# Patient Record
Sex: MALE | Race: WHITE
[De-identification: names, ages, dates, MRNs, and addresses within clinical notes are randomized per-mention and may not be internally consistent; named-entity substitution may affect disease eponyms.]

---

## 2020-09-05 ENCOUNTER — HOSPITAL ENCOUNTER (OUTPATIENT)
Dept: HOSPITAL 88 - ER | Age: 75
Setting detail: OBSERVATION
LOS: 1 days | Discharge: HOME | End: 2020-09-06
Attending: INTERNAL MEDICINE | Admitting: INTERNAL MEDICINE
Payer: MEDICARE

## 2020-09-05 VITALS — BODY MASS INDEX: 26.12 KG/M2 | HEIGHT: 66 IN | WEIGHT: 162.5 LBS

## 2020-09-05 DIAGNOSIS — I10: ICD-10-CM

## 2020-09-05 DIAGNOSIS — C61: ICD-10-CM

## 2020-09-05 DIAGNOSIS — R11.10: ICD-10-CM

## 2020-09-05 DIAGNOSIS — I95.9: Primary | ICD-10-CM

## 2020-09-05 DIAGNOSIS — Z11.59: ICD-10-CM

## 2020-09-05 DIAGNOSIS — J45.909: ICD-10-CM

## 2020-09-05 DIAGNOSIS — E78.00: ICD-10-CM

## 2020-09-05 LAB
ALBUMIN SERPL-MCNC: 4.1 G/DL (ref 3.5–5)
ALBUMIN/GLOB SERPL: 1.5 {RATIO} (ref 0.8–2)
ALP SERPL-CCNC: 66 IU/L (ref 40–150)
ALT SERPL-CCNC: 41 IU/L (ref 0–55)
ANION GAP SERPL CALC-SCNC: 18.2 MMOL/L (ref 8–16)
BASOPHILS # BLD AUTO: 0.1 10*3/UL (ref 0–0.1)
BASOPHILS NFR BLD AUTO: 0.8 % (ref 0–1)
BUN SERPL-MCNC: 27 MG/DL (ref 7–26)
BUN/CREAT SERPL: 25 (ref 6–25)
CALCIUM SERPL-MCNC: 9.8 MG/DL (ref 8.4–10.2)
CHLORIDE SERPL-SCNC: 102 MMOL/L (ref 98–107)
CK MB SERPL-MCNC: 3.2 NG/ML (ref 0–5)
CK SERPL-CCNC: 59 IU/L (ref 30–200)
CO2 SERPL-SCNC: 20 MMOL/L (ref 22–29)
DEPRECATED NEUTROPHILS # BLD AUTO: 5.6 10*3/UL (ref 2.1–6.9)
EGFRCR SERPLBLD CKD-EPI 2021: > 60 ML/MIN (ref 60–?)
EOSINOPHIL # BLD AUTO: 0.2 10*3/UL (ref 0–0.4)
EOSINOPHIL NFR BLD AUTO: 2.4 % (ref 0–6)
ERYTHROCYTE [DISTWIDTH] IN CORD BLOOD: 15.8 % (ref 11.7–14.4)
GLOBULIN PLAS-MCNC: 2.7 G/DL (ref 2.3–3.5)
GLUCOSE SERPLBLD-MCNC: 112 MG/DL (ref 74–118)
HCT VFR BLD AUTO: 37.6 % (ref 38.2–49.6)
HGB BLD-MCNC: 12.1 G/DL (ref 14–18)
LYMPHOCYTES # BLD: 1.3 10*3/UL (ref 1–3.2)
LYMPHOCYTES NFR BLD AUTO: 16.9 % (ref 18–39.1)
MCH RBC QN AUTO: 26.9 PG (ref 28–32)
MCHC RBC AUTO-ENTMCNC: 32.2 G/DL (ref 31–35)
MCV RBC AUTO: 83.6 FL (ref 81–99)
MONOCYTES # BLD AUTO: 0.7 10*3/UL (ref 0.2–0.8)
MONOCYTES NFR BLD AUTO: 8.5 % (ref 4.4–11.3)
NEUTS SEG NFR BLD AUTO: 71 % (ref 38.7–80)
PLATELET # BLD AUTO: 179 X10E3/UL (ref 140–360)
POTASSIUM SERPL-SCNC: 4.2 MMOL/L (ref 3.5–5.1)
RBC # BLD AUTO: 4.5 X10E6/UL (ref 4.3–5.7)
SODIUM SERPL-SCNC: 136 MMOL/L (ref 136–145)

## 2020-09-05 PROCEDURE — 70450 CT HEAD/BRAIN W/O DYE: CPT

## 2020-09-05 PROCEDURE — 85025 COMPLETE CBC W/AUTO DIFF WBC: CPT

## 2020-09-05 PROCEDURE — 93005 ELECTROCARDIOGRAM TRACING: CPT

## 2020-09-05 PROCEDURE — 80061 LIPID PANEL: CPT

## 2020-09-05 PROCEDURE — 82550 ASSAY OF CK (CPK): CPT

## 2020-09-05 PROCEDURE — 99285 EMERGENCY DEPT VISIT HI MDM: CPT

## 2020-09-05 PROCEDURE — 36415 COLL VENOUS BLD VENIPUNCTURE: CPT

## 2020-09-05 PROCEDURE — 80053 COMPREHEN METABOLIC PANEL: CPT

## 2020-09-05 PROCEDURE — 82553 CREATINE MB FRACTION: CPT

## 2020-09-05 PROCEDURE — 84484 ASSAY OF TROPONIN QUANT: CPT

## 2020-09-06 VITALS — DIASTOLIC BLOOD PRESSURE: 71 MMHG | SYSTOLIC BLOOD PRESSURE: 109 MMHG

## 2020-09-06 VITALS — SYSTOLIC BLOOD PRESSURE: 118 MMHG | DIASTOLIC BLOOD PRESSURE: 81 MMHG

## 2020-09-06 VITALS — SYSTOLIC BLOOD PRESSURE: 107 MMHG | DIASTOLIC BLOOD PRESSURE: 70 MMHG

## 2020-09-06 VITALS — DIASTOLIC BLOOD PRESSURE: 70 MMHG | SYSTOLIC BLOOD PRESSURE: 120 MMHG

## 2020-09-06 VITALS — SYSTOLIC BLOOD PRESSURE: 120 MMHG | DIASTOLIC BLOOD PRESSURE: 70 MMHG

## 2020-09-06 VITALS — DIASTOLIC BLOOD PRESSURE: 69 MMHG | SYSTOLIC BLOOD PRESSURE: 115 MMHG

## 2020-09-06 LAB
CHOLEST SERPL-MCNC: 150 MD/DL (ref 0–199)
CHOLEST/HDLC SERPL: 3.9 {RATIO} (ref 3.9–4.7)
CK MB SERPL-MCNC: 2.1 NG/ML (ref 0–5)
CK MB SERPL-MCNC: 2.2 NG/ML (ref 0–5)
CK SERPL-CCNC: 47 IU/L (ref 30–200)
CK SERPL-CCNC: 48 IU/L (ref 30–200)
HDLC SERPL-MSCNC: 38 MG/DL (ref 40–60)
LDLC SERPL CALC-MCNC: 76 MG/DL (ref 60–130)
TRIGL SERPL-MCNC: 179 MG/DL (ref 0–149)

## 2020-09-13 ENCOUNTER — HOSPITAL ENCOUNTER (INPATIENT)
Dept: HOSPITAL 88 - ER | Age: 75
LOS: 4 days | Discharge: HOME | DRG: 330 | End: 2020-09-17
Attending: INTERNAL MEDICINE | Admitting: INTERNAL MEDICINE
Payer: MEDICARE

## 2020-09-13 VITALS — HEIGHT: 66 IN | BODY MASS INDEX: 26.41 KG/M2 | WEIGHT: 164.31 LBS

## 2020-09-13 DIAGNOSIS — K20.9: ICD-10-CM

## 2020-09-13 DIAGNOSIS — I10: ICD-10-CM

## 2020-09-13 DIAGNOSIS — Z11.59: ICD-10-CM

## 2020-09-13 DIAGNOSIS — E87.2: ICD-10-CM

## 2020-09-13 DIAGNOSIS — Z85.46: ICD-10-CM

## 2020-09-13 DIAGNOSIS — K40.30: Primary | ICD-10-CM

## 2020-09-13 DIAGNOSIS — J45.909: ICD-10-CM

## 2020-09-13 LAB
ALBUMIN SERPL-MCNC: 4.5 G/DL (ref 3.5–5)
ALBUMIN/GLOB SERPL: 1.4 {RATIO} (ref 0.8–2)
ALP SERPL-CCNC: 84 IU/L (ref 40–150)
ALT SERPL-CCNC: 36 IU/L (ref 0–55)
ANION GAP SERPL CALC-SCNC: 19.8 MMOL/L (ref 8–16)
BACTERIA URNS QL MICRO: (no result) /HPF
BASOPHILS # BLD AUTO: 0 10*3/UL (ref 0–0.1)
BASOPHILS NFR BLD AUTO: 0.5 % (ref 0–1)
BILIRUB UR QL: NEGATIVE
BUN SERPL-MCNC: 7 MG/DL (ref 7–26)
BUN/CREAT SERPL: 9 (ref 6–25)
CALCIUM SERPL-MCNC: 10 MG/DL (ref 8.4–10.2)
CHLORIDE SERPL-SCNC: 105 MMOL/L (ref 98–107)
CK MB SERPL-MCNC: 4 NG/ML (ref 0–5)
CLARITY UR: CLEAR
CO2 SERPL-SCNC: 19 MMOL/L (ref 22–29)
COLOR UR: YELLOW
DEPRECATED NEUTROPHILS # BLD AUTO: 4.6 10*3/UL (ref 2.1–6.9)
DEPRECATED RBC URNS MANUAL-ACNC: (no result) /HPF (ref 0–5)
EGFRCR SERPLBLD CKD-EPI 2021: > 60 ML/MIN (ref 60–?)
EOSINOPHIL # BLD AUTO: 0.1 10*3/UL (ref 0–0.4)
EOSINOPHIL NFR BLD AUTO: 1.8 % (ref 0–6)
EPI CELLS URNS QL MICRO: (no result) /LPF
ERYTHROCYTE [DISTWIDTH] IN CORD BLOOD: 15.9 % (ref 11.7–14.4)
GLOBULIN PLAS-MCNC: 3.2 G/DL (ref 2.3–3.5)
GLUCOSE SERPLBLD-MCNC: 116 MG/DL (ref 74–118)
HCT VFR BLD AUTO: 41.8 % (ref 38.2–49.6)
HGB BLD-MCNC: 13.3 G/DL (ref 14–18)
KETONES UR QL STRIP.AUTO: NEGATIVE
LEUKOCYTE ESTERASE UR QL STRIP.AUTO: NEGATIVE
LYMPHOCYTES # BLD: 1.1 10*3/UL (ref 1–3.2)
LYMPHOCYTES NFR BLD AUTO: 17.3 % (ref 18–39.1)
MCH RBC QN AUTO: 26.7 PG (ref 28–32)
MCHC RBC AUTO-ENTMCNC: 31.8 G/DL (ref 31–35)
MCV RBC AUTO: 83.8 FL (ref 81–99)
MONOCYTES # BLD AUTO: 0.3 10*3/UL (ref 0.2–0.8)
MONOCYTES NFR BLD AUTO: 4.6 % (ref 4.4–11.3)
NEUTS SEG NFR BLD AUTO: 75.5 % (ref 38.7–80)
NITRITE UR QL STRIP.AUTO: NEGATIVE
PLATELET # BLD AUTO: 188 X10E3/UL (ref 140–360)
POTASSIUM SERPL-SCNC: 3.8 MMOL/L (ref 3.5–5.1)
PROT UR QL STRIP.AUTO: NEGATIVE
RBC # BLD AUTO: 4.99 X10E6/UL (ref 4.3–5.7)
SODIUM SERPL-SCNC: 140 MMOL/L (ref 136–145)
SP GR UR STRIP: 1.01 (ref 1.01–1.02)
UROBILINOGEN UR STRIP-MCNC: 0.2 MG/DL (ref 0.2–1)
WBC #/AREA URNS HPF: (no result) /HPF (ref 0–5)

## 2020-09-13 PROCEDURE — 80048 BASIC METABOLIC PNL TOTAL CA: CPT

## 2020-09-13 PROCEDURE — 93005 ELECTROCARDIOGRAM TRACING: CPT

## 2020-09-13 PROCEDURE — 85025 COMPLETE CBC W/AUTO DIFF WBC: CPT

## 2020-09-13 PROCEDURE — 96374 THER/PROPH/DIAG INJ IV PUSH: CPT

## 2020-09-13 PROCEDURE — 82550 ASSAY OF CK (CPK): CPT

## 2020-09-13 PROCEDURE — 86900 BLOOD TYPING SEROLOGIC ABO: CPT

## 2020-09-13 PROCEDURE — 99284 EMERGENCY DEPT VISIT MOD MDM: CPT

## 2020-09-13 PROCEDURE — 74177 CT ABD & PELVIS W/CONTRAST: CPT

## 2020-09-13 PROCEDURE — 96375 TX/PRO/DX INJ NEW DRUG ADDON: CPT

## 2020-09-13 PROCEDURE — 83690 ASSAY OF LIPASE: CPT

## 2020-09-13 PROCEDURE — 36415 COLL VENOUS BLD VENIPUNCTURE: CPT

## 2020-09-13 PROCEDURE — 84484 ASSAY OF TROPONIN QUANT: CPT

## 2020-09-13 PROCEDURE — 85018 HEMOGLOBIN: CPT

## 2020-09-13 PROCEDURE — 82553 CREATINE MB FRACTION: CPT

## 2020-09-13 PROCEDURE — 86850 RBC ANTIBODY SCREEN: CPT

## 2020-09-13 PROCEDURE — 81001 URINALYSIS AUTO W/SCOPE: CPT

## 2020-09-13 PROCEDURE — 85014 HEMATOCRIT: CPT

## 2020-09-13 PROCEDURE — 83605 ASSAY OF LACTIC ACID: CPT

## 2020-09-13 PROCEDURE — 80053 COMPREHEN METABOLIC PANEL: CPT

## 2020-09-13 RX ADMIN — Medication PRN MG: at 21:17

## 2020-09-13 NOTE — NUR
PT STATES PAIN IS WORSE AND HE FEELS DIZZY.  PTS BP 64/41.  DR PARIKH 
NOTIFIED, NEW ORDERS GIVEN AND CARRIED OUT.  PLEASE SEE MAR.

## 2020-09-13 NOTE — XMS REPORT
Clinical Summary

                             Created on: 2020



Anastacio Robbins

External Reference #: [id number]

: 1945

Sex: Male



Demographics





                          Address                   [address]  [address]

 

                          Home Phone                [phone]

 

                          Preferred Language        English

 

                          Marital Status            Unknown

 

                          Church Affiliation     Spiritism

 

                          Race                      White

 

                          Ethnic Group              Non-





Author





                          Author                    [facility]

 

                          Address                   Unknown

 

                          Phone                     Unavailable







Support





                Name            Relationship    Address         Phone

 

                    Radha Dolan      [employer]                [address]  [address]                [phone]







Care Team Providers





                    Care Team Member Name Role                Phone

 

                    Tiffany Cleary MD PCP                 Unavailable







Allergies





                                        Comments



                 Active Allergy  Reactions       Severity        Noted Date 

 

                                        



Paradoxical reaction



                           Midazolam                 2018 







Medications





                          End Date                  Status



              Medication   Sig          Dispensed    Refills      Start  



                                         Date  

 

                                                    Active



                     diphenhydrAMINE     Take 25 mg by       0   



                           (BENADRYL) 25 mg tablet   mouth every 3     



                                         (three)     



                                         hours.     

 

                                                    Active



                     magnesium oxide-Mg AA  Take 250 mg         0   



                           chelate (MAGNESIUM, AMINO  by mouth 3     



                           ACID CHELATE,) 133 mg Tab  (three) times     



                                         daily.     

 

                                                    Active



                     coenzyme Q10 (CO Q-10)  Take 100 mg         0   



                           100 mg capsule            by mouth     



                                         daily.     

 

                                                    Active



                     amLODIPine (NORVASC) 10  Take 10 mg by       0   



                           MG tabletIndications:     mouth daily.     



                                         high blood pressure      

 

                                                    Active



                 tamsulosin (FLOMAX) 0.4  Take 0.4 mg     0               /

201  



                     mg Cp24 24 hr capsule  by mouth            7  



                                         daily.     

 

                                                    Active



                 calcium carbonate-vitamin  Take 1 tablet   0                 



                     D3 (OSCAL-D) 500    by mouth 2          6  



                           mg(1,250mg) -200 unit per  (two) times     



                           tablet                    daily.     

 

                                                    Active



                 hydroCHLOROthiazide  Take 25 mg by   0               / 

 



                     (HYDRODIURIL) 25 MG  mouth daily.        7  



                                         tablet      

 

                                                    Active



                     aspirin 325 MG tablet  Take 325 mg         0   



                                         by mouth     



                                         daily.     

 

                                                    Active



                     potassium chloride 25 mEq  Take by             0   



                           Pack                      mouth.     

 

                                                    Active



                     bicalutamide (CASODEX) 50  Take 50 mg by       0   



                           MG tablet                 mouth 3     



                                         (three) times     



                                         daily.     

 

                                                    Active



                     denosumab (XGEVA) 120  Inject              0   



                           mg/1.7 mL (70 mg/mL)      subcutaneousl     



                           injection                 y.     

 

                                                    Active



                     ranitidine (ZANTAC) 150  Take 150 mg         0   



                           MG capsule                by mouth 2     



                                         (two) times     



                                         daily.     

 

                                                    Active



                     traMADol (ULTRAM) 50 mg  Take 50 mg by       0   



                           tablet                    mouth every 6     



                                         (six) hours     



                                         as needed for     



                                         Pain.     

 

                                                    Active



                     docusate sodium (COLACE)  Take 100 mg         0   



                           100 MG capsule            by mouth 2     



                                         (two) times     



                                         daily.     

 

                                                    Active



                     leuprolide acetate  Inject              0   



                           (LUPRON DEPOT, 6 MONTH,   intramuscular     



                           IM)                       ly.     







Active Problems





 



                           Problem                   Noted Date

 

 



                           Achalasia                 2018

 

 



                           Septic arthritis          01/15/2018

 

 



                           Pyogenic arthritis of left knee joint, due to unspeci

fied organism  2018

 

 



                           BPH (benign prostatic hyperplasia)  05/15/2015







Social History





                                        Date



                 Tobacco Use     Types           Packs/Day       Years Used 

 

                                        Quit: 2018



                     Former Smoker       0.25                10 

 

    



                                         Smokeless Tobacco: Never   



                                         Used   







                                        Tobacco Cessation: Counseling Given: Yes









   



                 Alcohol Use     Drinks/Week     oz/Week         Comments

 

   



                     Yes                 3 Cans of           1.8 



                                         beer  







 



                           Sex Assigned at Birth     Date Recorded

 

 



                                         Not on file 







                                        Industry



                           Job Start Date            Occupation 

 

                                        Not on file



                           Not on file               Not on file 







                                        Travel End



                           Travel History            Travel Start 

 





                                         No recent travel history available.







Last Filed Vital Signs

Not on file



Plan of Treatment





Not on file



Results

Not on fileafter 2019



Insurance





     



            Payer      Benefit    Subscriber ID  Type       Phone      Address



                                         Plan /    



                                         Group    

 

     



                     [facility]          [address]   



                                         MEDICARE    



                                         ADV    







     



            Guarantor Name  Account    Relation to  Date of    Phone      Billin

g Address



                     Type                Patient             Birth  

 

     



            ItzelAnastacio mccullough  Personal/F  Self       1945  [phone]  3

[address]



                     [email or web address]               (Home)              [place] [address]







Advance Directives





For more information, please contact:



[facility]



[address]



[place] [address] [phone]







                          Date Inactivated          Comments



                           Code Status               Date Activated  

 

                          2018  7:46 PM         



                           Full Code                 2018  9:44 AM  







  



                           This code status was determined by:  Patient 







                                                     

 

                          2015  3:57 PM         



                           Full Code                 5/15/2015  4:23 PM  







  



                           This code status was determined by:  Patient

## 2020-09-13 NOTE — XMS REPORT
Continuity of Care Document

                             Created on: 2020



PARAS BAUTISTA

External Reference #: [id number]

: 1945

Sex: Male



Demographics





                          Address                   [address] [place]  [address]

 

                          Home Phone                [phone]

 

                          Preferred Language        English

 

                          Marital Status            Unknown

 

                          Restorationist Affiliation     Unknown

 

                          Race                      Unknown

 

                          Additional Race(s)        White



 

                          Ethnic Group              Non-





Author





                          Author                    [facility]

t

 

                          Organization              [facility]

t

 

                          Address                   [address]

[place]  [address]



 

                          Phone                     Unavailable







Support





                Name            Relationship    Address         Phone

 

                    RADHA SONG    PRS                 [address]

ELIOT FLOOD MD                 [phone]

 

                    RADHA SONG    PRS                 [address]

ELIOT FLOOD MD                 [phone]

 

                    Radha Dolan    ECON                [address] [place]  [address]                [phone]

 

                    RADHA DOLAN    ECON                UNK

UN, UN  UN                              Unavailable







Care Team Providers





                    Care Team Member Name Role                Phone

 

                    PAULETTE PENA  PCP                 [phone]

 

                    KIM PARIKH   Attphys             Unavailable

 

                    Valentin SZYMANSKI,  [facility] Attphys             [phone]

 

                    MARIANELA KRISHNA Attphys             Unavailable

 

                    JL BREWER Admphys             Unavailable







Payers





           Payer Name Policy Type Policy Number Effective Date Expiration Date KIM leone

 

           [employer] Advantage            [id number]                

       [facility]







Problems





           Condition Name Condition Details Condition Category Status     Onset 

Date Resolution

Date            Last Treatment Date Treating Clinician Comments        Source

 

       Achalasia Achalasia Disease Active 2018 00:00:00                   

          [facility]

 

       Septic arthritis Septic arthritis Disease Active 2018-01-15 00:00:00     

                        [facility]

 

                          Pyogenic arthritis of left knee joint, due to unspecif

ied organism Pyogenic 

arthritis of left knee joint, due to unspecified organism Disease             Ac

tive              

2018 00:00:00                                                     [facility]

 

                BPH (benign prostatic hyperplasia) BPH (benign prostatic hyperpl

adrienne) Disease         

Active     2015-05-15 00:00:00                                             [facility]

 

       Chest pain        Problem Active                                    [facility]







Allergies, Adverse Reactions, Alerts





        Allergy Name Allergy Type Status  Severity Reaction(s) Onset Date Inacti

ve Date 

Treating Clinician        Comments                  Source

 

           MIDALOZAM  Propensity to adverse reactions Active                para

doxical reaction 

2020 00:00:00                                                 [facility]

 

        Midazolam Propensity to adverse reactions Active                  2018 00:00:00                 

Paradoxical reaction                    [facility]

 

       No Known Allergies DA     Active U             2018 00:00:00       

               [facility]







Social History





           Social Habit Start Date Stop Date  Quantity   Comments   Source

 

           Sex Assigned At Birth                                             [facility]

 

                    Cigarettes smoked current (pack per day) - Reported  00:00:00 

2018 00:00:00                                         [facility]

 

           Cigarette pack-years 2018 00:00:00 2018 00:00:00         

              [facility]

 

           History of tobacco use            2018 00:00:00 Current smoker 

           [facility]







                Smoking Status  Start Date      Stop Date       Source

 

                Former smoker   2018 00:00:00 2018 00:00:00 [facility]







Medications





             Ordered Medication Name Filled Medication Name Start Date   Stop Da

te    Current 

Medication? Ordering Clinician Indication Dosage     Frequency  Signature (SIG) 

Comments                  Components                Source

 

        docusate sodium (COLACE) 100 MG capsule         2018 13:14:40     

    Yes                     100mg   

Q.5D            Take 100 mg by mouth 2 (two) times daily.                       

          [facility]

 

        leuprolide acetate (LUPRON DEPOT, 6 MONTH, IM)         2018 13:14:

40         Yes                      

                          Inject intramuscularly.                           [facility]

 

       traMADol (ULTRAM) 50 mg tablet        2018 13:08:16        Yes     

             50mg          Take 50 mg

by mouth every 6 (six) hours as needed for Pain.                                

         [facility]

 

       ranitidine (ZANTAC) 150 MG capsule        2018 12:51:45        Yes 

                 150mg  Q.5D   

Take 150 mg by mouth 2 (two) times daily.                                       

  [facility]

 

       potassium chloride 25 mEq Pack        2018 12:51:44        Yes     

                           Take by 

mouth.                                                      [facility]

 

        bicalutamide (CASODEX) 50 MG tablet         2018 12:51:44         

Yes                     50mg    

Q.[id number] Take 50 mg by mouth 3 (three) times daily.               

                  [facility]

 

             denosumab (XGEVA) 120 mg/1.7 mL (70 mg/mL) injection              2

 12:51:44              Yes

                                        Inject subcutaneously.                 C

[facility]

 

       aspirin 325 MG tablet        2018 11:32:53        Yes              

    325mg  QD     Take 325 mg by 

mouth daily.                                                [facility]

 

          amLODIPine (NORVASC) 10 MG tablet           2018 06:37:06       

    Yes                 high blood 

pressure   10mg       QD         Take 10 mg by mouth daily.                     

  [facility]

 

        hydroCHLOROthiazide (HYDRODIURIL) 25 MG tablet         2017 00:00:

00         Yes                     

25mg         QD           Take 25 mg by mouth daily.                           C

[facility]

 

        tamsulosin (FLOMAX) 0.4 mg Cp24 24 hr capsule         2017 00:00:0

0         Yes                     

.4mg         QD           Take 0.4 mg by mouth daily.                           

[facility]

 

                    calcium carbonate-vitamin D3 (OSCAL-D) 500 mg(1,250mg) -200 

unit per tablet                     

2016 00:00:00           Yes                           1{tbl}    Q.5D      

Take 1 tablet by mouth 2 (two) times 

daily.                                                      [facility]

 

       diphenhydrAMINE (BENADRYL) 25 mg tablet        2015 10:47:26       

 Yes                  25mg          

Take 25 mg by mouth every 3 (three) hours.                                      

   [facility]

 

                    magnesium oxide-Mg AA chelate (MAGNESIUM, AMINO ACID CHELATE

,) 133 mg Tab                     

2015 10:47:26           Yes                           250mg     [id number]

[id number] Take 250 mg by mouth 

3 (three) times daily.                                         [facility]

 

       coenzyme Q10 (CO Q-10) 100 mg capsule        2015 10:47:26        Y

es                  100mg  QD     

Take 100 mg by mouth daily.                                         [facility]

 

                          Calcium Carbonate/Vitamin D3 (Os-Guilherme 500+D Tablet) 1 E

ach TABLET Calcium 

Carbonate/Vitamin D3 (Os-Guilherme 500+D Tablet) 1 Each TABLET                 Yes    

                 1               Twice A

Day                                                         [facility]

 

                          Cyanocobalamin (Vitamin B-12) (Vitamin B-12) 1,000 Mcg

 TAB.SUBL Cyanocobalamin 

(Vitamin B-12) (Vitamin B-12) 1,000 Mcg TAB.SUBL             Yes               5

00         Daily             [facility]

 

     Diphenhydramine Hcl Diphenhydramine Hcl           Yes            25        

As Needed           [facility]

 

     Famotidine Famotidine           Yes            20        Twice A Day       

    [facility]

 

                          Leuprolide Acetate (Lupron Depot) 45 Mg SYRINGEKIT Romero

prolide Acetate (Lupron 

Depot) 45 Mg SYRINGEKIT             Yes               45          Use As Directe

d             [facility]

 

     Lisinopril Lisinopril           Yes            10        Daily           [facility]

[facility]

 

     Magnesium Oxide Magnesium Oxide           Yes            400       Twice A 

Day           [facility]

 

     Potassium Chloride Potassium Chloride           Yes            15        Us

e As Directed           [facility]

 

     Prednisone Prednisone           Yes            5         Twice A Day       

    [facility]

 

           Tamsulosin Hcl (Flomax*) 0.4 Mg CAP Tamsulosin Hcl (Flomax*) 0.4 Mg C

AP                       Yes        

                    .4                  Daily                         [facility]

 

                Ubidecarenone (Co Q-10) 100 Mg CAPSULE Ubidecarenone (Co Q-10) 1

00 Mg CAPSULE                 

        Yes                     100             Use As Directed                 

[facility]

 

     Vitamin D Vitamin D           Yes            1000      Daily           [facility]

 

      Amlodipine Besylate Amlodipine Besylate       2020 00:00:00 No      

          10          Daily 

                                                    [facility]







Vital Signs





             Vital Name   Observation Time Observation Value Comments     Source

 

             Body Temperature 2020 16:00:00 98.5 [degF]               [facility]

 

             Weight       2020 05:01:00 162.50 [lb_av]              [facility]

 

             BMI (Body Mass Index) 2020 05:01:00 26.2 kg/m2               

 [facility]







Procedures





                Procedure       Date / Time Performed Performing Clinician Sour

e

 

                Computed tomography of brain without radiopaque contrast 2020 00:00:00                 

[facility]







Plan of Care





             Planned Activity Planned Date Details      Comments     Source

 

             Instructions              Chest Pain - Noncardiac              [facility]

 

             Instructions              Dizziness                 [facility]







Encounters





             Start Date/Time End Date/Time Encounter Type Admission Type [facility]   Care Department Encounter ID    Source

 

             2020 23:42:00 2020 17:51:00 Discharged Inpatient (obs) 

             RACHEL PARIKH          [facility] [id number]    [facility]

[facility]

 

           2019-10-16 10:42:13 2019-10-16 16:08:46 Office Visit            Hali Gaston [facility] 

AMBULATORY          [id number]    

         







Results





           Test Description Test Time  Test Comments Results    Result Comments 

Source

 

                          Serum or plasma creatine kinase measurement (enzymatic

 activity/volume) 

2020 14:21:00                       

 

                                        Test Item

 

             Creatine Kinase (test code = 2157-6) 47                      

          





[facility] 
[facility] (mass/volume)2020 14:21:00* 



             Test Item    Value        Reference Range Interpretation Comments

 

             Creatine Kinase MB (test code = 43741-4) 2.10         0-5.0        

              





[facility] by highly 
sensitive enzyme [id number] 14:21:00* 



             Test Item    Value        Reference Range Interpretation Comments

 

             Troponin I (test code = 14114-3) 0.021        0-0.300              

      





[facility] triglyceride [facility] 
(mass/volume)2020 08:10:00* 



             Test Item    Value        Reference Range Interpretation Comments

 

             Triglycerides Level (test code = 2571-8) 179          0-149        

              





[facility] cholesterol [facility] 
(mass/volume)2020 08:10:00* 



             Test Item    Value        Reference Range Interpretation Comments

 

             Cholesterol Level (test code = 2093-3) 150          0-199          

            





Less than 200 mg/dL       Low Xhoc717 - 239 mg/dL           Borderline Kzjh422 m
g/dl and greater     High Risk                        [facility] cholesterol in LDL measurement (mass/volume)
2020 08:10:00* 



             Test Item    Value        Reference Range Interpretation Comments

 

             LDL Cholesterol (test code = 2089-1) 76                      

          





[facility] in HDL 
measurement (mass/volume)2020 08:10:00* 



             Test Item    Value        Reference Range Interpretation Comments

 

             HDL Cholesterol (test code = 2085-9) 38           40-60            

          





[facility] total 
cholesterol/cholesterol in HDL mass [id number] 08:10:00* 



             Test Item    Value        Reference Range Interpretation Comments

 

             Cholesterol/HDL Ratio (test code = 9830-1) 3.9          3.9-4.7    

                





[facility] [id number] 21:45:00           
                                                                           [facility]                        [address] [place] [address]      Patient Name: PARAS BAUTISTA           
                     MR #: [id number]                  : 1945          
                         Age/Sex: 74/M  Acct #: [id number]                    
          Req #: [id number]  Adm Physician:                                     
                 Ordered by: RACHEL PARIKH DO                         Report
 #: [id number]        Location:                                       Room/Bed:
                   ________________________________________________________
___________________________________________    Procedure: [id number] CT/CT BRAIN 
WO  Exam Date: 20                            Exam Time:               
                                REPORT STATUS: Signed    EXAMINATION: Head CT wi
thout contrast.           HISTORY:Dizziness and blurred vision.      COMPARISON:
None.   TECHNIQUE: Multidetector axial images were obtained from the foramen mag
num to   the vertex without contrast. The images were reconstructed using brain 
and bone   algorithms.  Thin section brain images were reformatted into coronal 
and   sagittal planes.   Dose modulation, iterative reconstruction, and/or weigh
t based adjustment of   the mA/kV was utilized to reduce the radiation dose to a
s low as reasonably   achievable.      Intravenous contrast: None        IMAGE Q
UALITY: Suboptimal evaluation particularly at the level of skull base   and post
erior fossa structures due to streak artifacts.      FINDINGS:       Skull/scalp
: No lytic or blastic. lesions.  No surgical changes.       Parenchyma: Nonspeci
fic bilateral frontoparietal patchy white matter   hypodensity are likely relate
d to small vessel ischemic changes.   No acute hemorrhage, mass or acute major v
ascular territorial infarct.       Arteries: No density suggestive of thrombosis
. Atherosclerotic   calcification in bilateral carotid siphon and V4 segment of 
the vertebral   arteries.          Dural sinuses: No abnormal density suggestive
 of thrombosis.        Ventricles: No hydrocephalus or displacement.       Extra
-axial spaces: No abnormal density.         Brain volume: Mild generalized age-r
elated cerebral volume loss.       Craniocervical junction: No mass, Chiari malf
ormation, or basilar   invagination.       Sella: No mass.        Paranasal/mast
oid sinuses: Imaged portions unremarkable.         IMPRESSION:   No acute intrac
ranial abnormality.      Mild generalized cerebral volume loss.      Mild suprat
entorial white matter microvascular ischemic changes.      Signed by: Dr. Mikey Steven M.D. on 2020 9:48 PM        Dictated By: ERICK STEVEN MD  Elec
tronically Signed By: ERICK STEVEN MD on 20  Transcribed By: SHANNON ECHEVARRIA on 20       COPY TO:   RACHEL PARIKH DO         Blood 
leukocytes automated count (number/volume)2020 21:40:00* 



             Test Item    Value        Reference Range Interpretation Comments

 

             White Blood Count (test code = 6690-2) 7.86         4.8-10.8       

            





[facility] 
(number/volume)2020 21:40:00* 



             Test Item    Value        Reference Range Interpretation Comments

 

             Red Blood Count (test code = 789-8) 4.50         4.3-5.7           

         





[facility] measurement 
(moles/volume)2020 21:40:00* 



             Test Item    Value        Reference Range Interpretation Comments

 

             Hemoglobin (test code = 53434-0) 12.1         14.0-18.0            

      





[facility] HospitalAutomated blood hematocrit (volume 
fraction)2020 21:40:00* 



             Test Item    Value        Reference Range Interpretation Comments

 

             Hematocrit (test code = 4544-3) 37.6         38.2-49.6             

     





[facility] HospitalAutomated erythrocyte mean corpuscular 
[id number] 21:40:00* 



             Test Item    Value        Reference Range Interpretation Comments

 

             Mean Corpuscular Volume (test code = 787-2) 83.6         81-99     

                 





[facility] Hospital[facility]ated erythrocyte mean corpuscular 
hemoglobin (mass per erythrocyte)2020 21:40:00* 



             Test Item    Value        Reference Range Interpretation Comments

 

             Mean Corpuscular Hemoglobin (test code = 785-6) 26.9         28-32 

                     





[facility] Hospital[facility]ated erythrocyte mean corpuscular 
hemoglobin concentration measurement (mass/volume)2020 21:40:00* 



             Test Item    Value        Reference Range Interpretation Comments

 

             Mean Corpuscular Hemoglobin Concent (test code = 786-4) 32.2       

  31-35                      





[facility] HospitalRDW [id number] 21:40:00* 



             Test Item    Value        Reference Range Interpretation Comments

 

             Red Cell Distribution Width (test code = 26043-3) 15.8         11.7

-14.4                  





[facility] Hospital[facility]ated blood platelet count 
(count/volume)2020 21:40:00* 



             Test Item    Value        Reference Range Interpretation Comments

 

             Platelet Count (test code = 777-3) 179          140-360            

        





[facility] HospitalAutomated blood segmented neutrophil 
count as percentage of total ebbsrsmkzn9667-13-48 21:40:00* 



             Test Item    Value        Reference Range Interpretation Comments

 

             Neutrophils (%) (Auto) (test code = 98033-0) 71.0         38.7-80.0

                  





[facility] HospitalAutomated blood lymphocyte count as 
percentage ot total fntplhrozy7683-39-47 21:40:00* 



             Test Item    Value        Reference Range Interpretation Comments

 

             Lymphocytes (%) (Auto) (test code = 736-9) 16.9         18.0-39.1  

                





[facility] HospitalAutomated blood monocyte count as 
percentage of total auuhpckzko0966-07-25 21:40:00* 



             Test Item    Value        Reference Range Interpretation Comments

 

             Monocytes (%) (Auto) (test code = 5905-5) 8.5          4.4-11.3    

               





[facility] HospitalAutomated blood eosinophil count as 
percentage of total vccmaxqntw9227-45-31 21:40:00* 



             Test Item    Value        Reference Range Interpretation Comments

 

             Eosinophils (%) (Auto) (test code = 713-8) 2.4          0.0-6.0    

                





[facility] HospitalAutomated blood basophil count as 
percentage of total nkbxrtdoin3039-92-32 21:40:00* 



             Test Item    Value        Reference Range Interpretation Comments

 

             Basophils (%) (Auto) (test code = 706-2) 0.8          0.0-1.0      

              





[facility] HospitalFluoroscopic procedure less than one hour
 [id number] 21:40:00* 



             Test Item    Value        Reference Range Interpretation Comments

 

             IM GRANULOCYTES % (test code = IM GRANULOCYTES %) 0.4          0.0-

1.0                    





[facility] HospitalAutomated blood neutrophil count
2020 21:40:00* 



             Test Item    Value        Reference Range Interpretation Comments

 

             Neutrophils # (Auto) (test code = 751-8) 5.6          2.1-6.9      

              





[facility] lymphocytes count (number/volume)
2020 21:40:00* 



             Test Item    Value        Reference Range Interpretation Comments

 

             Lymphocytes # (Auto) (test code = 27889-4) 1.3          1.0-3.2    

                





[facility] HospitalBlood monocytes automated count 
(number/volume)2020 21:40:00* 



             Test Item    Value        Reference Range Interpretation Comments

 

             Monocytes # (Auto) (test code = 742-7) 0.7          0.2-0.8        

            





[facility] HospitalAutomated blood eosinophil count
2020 21:40:00* 



             Test Item    Value        Reference Range Interpretation Comments

 

             Eosinophils # (Auto) (test code = 711-2) 0.2          0.0-0.4      

              





[facility] HospitalAutomated blood basophil count 
(count/volume)2020 21:40:00* 



             Test Item    Value        Reference Range Interpretation Comments

 

             Basophils # (Auto) (test code = 704-7) 0.1          0.0-0.1        

            





[facility] HospitalFluoroscopic procedure less than one hour
 wfaygazt6746-89-73 21:40:00* 



             Test Item    Value        Reference Range Interpretation Comments

 

                                        Absolute Immature Granulocyte (auto (lilly

t code = Absolute Immature Granulocyte 

(auto)          0.03            0-0.1                            





[facility] sodium [facility] 
(moles/volume)2020 21:40:00* 



             Test Item    Value        Reference Range Interpretation Comments

 

             Sodium Level (test code = 2951-2) 136          136-145             

       





[facility] Hospital[facility] or plasma potassium measurement 
(moles/volume)2020 21:40:00* 



             Test Item    Value        Reference Range Interpretation Comments

 

             Potassium Level (test code = 2823-3) 4.2          3.5-5.1          

          





[facility] 
(moles/volume)2020 21:40:00* 



             Test Item    Value        Reference Range Interpretation Comments

 

             Chloride Level (test code = 2075-0) 102                      

         





[facility] carbon dioxide, total 
measurement (moles/volume)2020 21:40:00* 



             Test Item    Value        Reference Range Interpretation Comments

 

             Carbon Dioxide Level (test code = 2028-9) 20           22-29       

               





[facility] anion [id number] 
21:40:00* 



             Test Item    Value        Reference Range Interpretation Comments

 

             Anion Gap (test code = 33037-3) 18.2         8-16                  

     





[facility] urea nitrogen measurement
 (mass/volume)2020 21:40:00* 



             Test Item    Value        Reference Range Interpretation Comments

 

             Blood Urea Nitrogen (test code = 3094-0) 27           7-26         

              





[facility] creatinine measurement 
(mass/volume)2020 21:40:00* 



             Test Item    Value        Reference Range Interpretation Comments

 

             Creatinine (test code = 2160-0) 1.10         0.72-1.25             

     





[facility] urea nitrogen/creatinine 
mass [id number] 21:40:00* 



             Test Item    Value        Reference Range Interpretation Comments

 

             BUN/Creatinine Ratio (test code = 3097-3) 25           6-25        

               





[facility] HospitalEstimated [facility] filtration rate 
(GFR) [id number] 21:40:00* 



             Test Item    Value        Reference Range Interpretation Comments

 

             Estimat Glomerular Filtration Rate (test code = 501966819) > 60    

     >60                        





Ranges were taken from the National Kidney Disease Education Program and the [facility] literature.Reference ranges:60 or greater: Dsyejc40-41 (
for 3 consecutive months): Chronic kidney disease 15 or less: Kidney failureMethodist HospitalGlucose [id number] 21:40:00* 



             Test Item    Value        Reference Range Interpretation Comments

 

             Glucose Level (test code = NQB7362) 112                      

         





[facility] calcium measurement 
(mass/volume)2020 21:40:00* 



             Test Item    Value        Reference Range Interpretation Comments

 

             Calcium Level (test code = 05160-9) 9.8          8.4-10.2          

         





[facility] total bilirubin 
measurement (mass/volume)2020 21:40:00* 



             Test Item    Value        Reference Range Interpretation Comments

 

             Total Bilirubin (test code = 1975-2) 0.8          0.2-1.2          

          





[facility] HospitalFluoroscopic procedure less than one hour
 [id number] 21:40:00* 



             Test Item    Value        Reference Range Interpretation Comments

 

                                        Aspartate Amino Transf (AST/SGOT) (test 

code = Aspartate Amino Transf 

(AST/SGOT))     60              5-34                             





[facility] plasma alanine aminotransferase 
measurement (enzymatic activity/volume)2020 21:40:00* 



             Test Item    Value        Reference Range Interpretation Comments

 

             Alanine Aminotransferase (ALT/SGPT) (test code = 1742-6) 41        

   0-55                       





[facility] plasma protein measurement 
(mass/volume)2020 21:40:00* 



             Test Item    Value        Reference Range Interpretation Comments

 

             Total Protein (test code = 2885-2) 6.8          6.5-8.1            

        





[facility] albumin measurement 
(mass/volume)2020 21:40:00* 



             Test Item    Value        Reference Range Interpretation Comments

 

             Albumin (test code = 1751-7) 4.1          3.5-5.0                  

  





[facility] HospitalPlasma globulin measurement (mass/volume)
2020 21:40:00* 



             Test Item    Value        Reference Range Interpretation Comments

 

             Globulin (test code = 15519-3) 2.7          2.3-3.5                

    





[facility] plasma albumin/globulin mass 
[id number] 21:40:00* 



             Test Item    Value        Reference Range Interpretation Comments

 

             Albumin/Globulin Ratio (test code = 1759-0) 1.5          0.8-2.0   

                 





[facility] plasma alkaline phosphatase 
measurement (enzymatic activity/volume)2020 21:40:00* 



             Test Item    Value        Reference Range Interpretation Comments

 

             Alkaline Phosphatase (test code = 6768-6) 66                 

               





[facility] HospitalFL, [facility] TECH IN [facility]/30 MINUTE INCREMENTS
2018 15:42:00Reason for exam:->DYSPHAGIAFINAL REPORT PATIENT ID:   
[id number] Nondiagnostic exam. Radiology provided fluoroscopy for ERCP performed 
by Dr. Montalvo.  Neither radiologist presence nor interpretation were requested. 
Please refer to the operative report for further information. Fluoroscopy time 
was 2.7 minutes. Total # of images: 8 Signed: JR Lundberg Robert MDReport 
Verified Date/Time:  2018 15:42:00 Reading Location: [address] Consult 
Reading Room    Electronically signed by: BRANDI LUNDBERG on 2018 
03:42 PM [place], RAD TECH IN OR/30 MINUTE [id number] 17:53:00Reason for 
exam:->acahalaciaAddendum BeginsREPORT STATUS:A PATIENT ID:   [id number] Addendum:
 Fluoroscopy nonspecific dated 2018 Signed: Sanjiv Hwang 
Verified Date/Time:  2018 17:53:22 Reading Location: [address] 
Radiology Reading RoomAddendum EndsFINAL REPORT PATIENT ID:   [id number] 
Fluoroscopy nonspecific dated 2010 18 Comment: Total fluoroscopy time 
was 2.7 minutes. Total number of fluoroscopy images were 7.  The fluoroscopy 
study was provided to Dr. Montalvo for intraoperative procedure. No radiologist 
was present during the examination. Signed: Sanjiv Hwang MDReport Verified 
Date/Time:  2018 17:43:25 Reading Location: [address] Radiology 
Reading Room      Electronically signed by: SANJIV HWANG M.D. on 2018 
05:53 PM BLOOD [id number] 23:00:00* 



             Test Item    Value        Reference Range Interpretation Comments

 

             CULTURE (BEAKER) (test code = 1095) No growth in 5 days            

                





BLOOD [id number] 23:00:00* 



             Test Item    Value        Reference Range Interpretation Comments

 

             CULTURE (BEAKER) (test code = 1095) No growth in 5 days            

                





BLOOD [id number] 10:46:00* 



             Test Item    Value        Reference Range Interpretation Comments

 

             CULTURE (BEAKER) (test code = 1095)                           A    

        From Anaerobic Bottle Only Same 

organism has been isolated from cultures(s) of the same body site within 3 days.
 Repeat identification and susceptibility testing performed only after 
consultation with the clinical microbiology laboratory.Refer to previous culture
 ofStaphylococcus aureus

 

                          GRAM STAIN RESULT (BEAKER) (test code = 1123) From lorrie

erobic bottle only: gram 

positive cocci in clusters                                          





BLOOD [id number] 10:45:00* 



             Test Item    Value        Reference Range Interpretation Comments

 

             CULTURE (BEAKER) (test code = 1095)                           A    

        From Anaerobic Bottle Only Same 

organism has been isolated from cultures(s) of the same body site within 3 days.
 Repeat identification and susceptibility testing performed only after 
consultation with the clinical microbiology laboratory.Refer to previous culture
 ofStaphylococcus aureus

 

                          GRAM STAIN RESULT (BEAKER) (test code = 1123) From lorrie

erobic bottle only: gram 

positive cocci in clusters                                          





POCT-GLUCOSE [id number] 09:40:00* 



             Test Item    Value        Reference Range Interpretation Comments

 

             POC-GLUCOSE METER (BEAKER) (test code = 1538) 128 mg/dL      

     H            TESTED AT [facility]

 [address] [facility] [address]





POCT-GLUCOSE [id number] 22:07:00* 



             Test Item    Value        Reference Range Interpretation Comments

 

             POC-GLUCOSE METER (BEAKER) (test code = 1538) 102 mg/dL      

                  TESTED AT [facility]

 [address] [facility] [address]





BASIC METABOLIC [id number] 06:06:00* 



             Test Item    Value        Reference Range Interpretation Comments

 

             SODIUM (BEAKER) (test code = 381) 131 meq/L    136-145      L      

       

 

             POTASSIUM (BEAKER) (test code = 379) 3.7 meq/L    3.5-5.1          

          

 

             CHLORIDE (BEAKER) (test code = 382) 94 meq/L            L    

         

 

             CO2 (BEAKER) (test code = 355) 25 meq/L     22-29                  

    

 

             BLOOD UREA NITROGEN (BEAKER) (test code = 354) 11 mg/dL     7-21   

                    

 

             CREATININE (BEAKER) (test code = 358) 0.64 mg/dL   0.57-1.25       

           

 

             GLUCOSE RANDOM (BEAKER) (test code = 652) 127 mg/dL          

 H             

 

             CALCIUM (BEAKER) (test code = 697) 9.0 mg/dL    8.4-10.2           

        

 

             EGFR (BEAKER) (test code = 1092) 123 mL/min/1.73 sq m              

             ESTIMATED GFR IS NOT 

AS ACCURATE AS CREATININE CLEARANCE IN PREDICTING GLOMERULAR FILTRATION RATE. 
ESTIMATED GFR IS NOT APPLICABLE FOR DIALYSIS PATIENTS.





CBC W/PLT COUNT & AUTO [id number] 05:40:00* 



             Test Item    Value        Reference Range Interpretation Comments

 

             WHITE BLOOD CELL COUNT (BEAKER) (test code = 775) 11.0 K/ L    3.5-

10.5     H             

 

             RED BLOOD CELL COUNT (BEAKER) (test code = 761) 4.24 M/ L    4.63-6

.08    L             

 

             HEMOGLOBIN (BEAKER) (test code = 410) 12.6 GM/DL   13.7-17.5    L  

           

 

             HEMATOCRIT (BEAKER) (test code = 411) 36.3 %       40.1-51.0    L  

           

 

             MEAN CORPUSCULAR VOLUME (BEAKER) (test code = 753) 85.6 fL      79.

0-92.2                  

 

             MEAN CORPUSCULAR HEMOGLOBIN (BEAKER) (test code = 751) 29.7 pg     

 25.7-32.2                  

 

                    MEAN CORPUSCULAR HEMOGLOBIN CONC (BEAKER) (test code = 752) 

34.7 GM/DL          32.3-36.5

                                                     

 

             RED CELL DISTRIBUTION WIDTH (BEAKER) (test code = 412) 13.2 %      

 11.6-14.4                  

 

             PLATELET COUNT (BEAKER) (test code = 756) 232 K/CU MM  150-450     

               

 

             MEAN PLATELET VOLUME (BEAKER) (test code = 754) 10.3 fL      9.4-12

.4                   

 

             NUCLEATED RED BLOOD CELLS (BEAKER) (test code = 413) 0 /100 WBC   0

-0                        

 

             NEUTROPHILS RELATIVE PERCENT (BEAKER) (test code = 429) 68 %       

                             

 

             LYMPHOCYTES RELATIVE PERCENT (BEAKER) (test code = 430) 19 %       

                             

 

             MONOCYTES RELATIVE PERCENT (BEAKER) (test code = 431) 9 %          

                           

 

             EOSINOPHILS RELATIVE PERCENT (BEAKER) (test code = 432) 2 %        

                             

 

             BASOPHILS RELATIVE PERCENT (BEAKER) (test code = 437) 0 %          

                           

 

             NEUTROPHILS ABSOLUTE COUNT (BEAKER) (test code = 670) 7.46 K/ L    

1.78-5.38    H             

 

             LYMPHOCYTES ABSOLUTE COUNT (BEAKER) (test code = 414) 2.05 K/ L    

1.32-3.57                  

 

             MONOCYTES ABSOLUTE COUNT (BEAKER) (test code = 415) 1.00 K/ L    0.

30-0.82    H             

 

             EOSINOPHILS ABSOLUTE COUNT (BEAKER) (test code = 416) 0.19 K/ L    

0.04-0.54                  

 

             BASOPHILS ABSOLUTE COUNT (BEAKER) (test code = 417) 0.03 K/ L    0.

01-0.08                  

 

             IMMATURE GRANULOCYTES-RELATIVE PERCENT (BEAKER) (test code = 2801) 

2 %          0-1          H             





POCT-GLUCOSE [id number] 21:41:00* 



             Test Item    Value        Reference Range Interpretation Comments

 

             POC-GLUCOSE METER (BEAKER) (test code = 1538) 199 mg/dL      

     H            TESTED AT [facility]

 [address] [facility] [address]





EOSINOPHIL SMEAR, [id number] 20:56:00* 



             Test Item    Value        Reference Range Interpretation Comments

 

             EOSINOPHIL SMEAR, URINE (BEAKER) (test code = 1851) No EOS seen  No

 EOS seen                





POCT-GLUCOSE [id number] 16:37:00* 



             Test Item    Value        Reference Range Interpretation Comments

 

             POC-GLUCOSE METER (BEAKER) (test code = 1538) 141 mg/dL      

     H            TESTED AT [facility]

 [address] [facility] [address]





RAD, ANKLE, 2 VIEWS, [id number] 15:22:00Reason for exam:->pain assess for 
effusionFINAL REPORT PATIENT ID:   [id number] Ankle, left, two views INDICATION: 
Pain. Effusion. COMPARISON: None available IMPRESSION: There is no evidence of 
acute fracture, dislocation, or destructive osseous lesion. Mild ankle joint 
arthritic changes are suspected with tiny osteophytes. There are incidental 
vascular calcifications. No significant ankle joint effusion is seen 
radiographically. Signed: Mirna Longoria MDReport Verified Date/Time:  
2018 15:22:52 Reading Location: [facility] C013W Consult Reading Room   
Electronically signed by: MIRNA LONGORIA M.D. on 2018 03:22 PM POCT-
GLUCOSE [id number] 09:24:00* 



             Test Item    Value        Reference Range Interpretation Comments

 

             POC-GLUCOSE METER (BEAKER) (test code = 1538) 119 mg/dL      

     H            TESTED AT [facility]

 [address] [facility] [address]





BASIC METABOLIC [id number] 06:29:00* 



             Test Item    Value        Reference Range Interpretation Comments

 

             SODIUM (BEAKER) (test code = 381) 130 meq/L    136-145      L      

       

 

             POTASSIUM (BEAKER) (test code = 379) 3.6 meq/L    3.5-5.1          

          

 

             CHLORIDE (BEAKER) (test code = 382) 89 meq/L            L    

         

 

             CO2 (BEAKER) (test code = 355) 27 meq/L     22-29                  

    

 

             BLOOD UREA NITROGEN (BEAKER) (test code = 354) 12 mg/dL     7-21   

                    

 

             CREATININE (BEAKER) (test code = 358) 1.63 mg/dL   0.57-1.25    H  

           

 

             GLUCOSE RANDOM (BEAKER) (test code = 652) 323 mg/dL          

 H             

 

             CALCIUM (BEAKER) (test code = 697) 8.6 mg/dL    8.4-10.2           

        

 

             EGFR (BEAKER) (test code = 1092) 42 mL/min/1.73 sq m               

            ESTIMATED GFR IS NOT AS

 ACCURATE AS CREATININE CLEARANCE IN PREDICTING GLOMERULAR FILTRATION RATE. 
ESTIMATED GFR IS NOT APPLICABLE FOR DIALYSIS PATIENTS.





CBC W/PLT COUNT & AUTO [id number] 05:52:00* 



             Test Item    Value        Reference Range Interpretation Comments

 

             WHITE BLOOD CELL COUNT (BEAKER) (test code = 775) 9.2 K/ L     3.5-

10.5                   

 

             RED BLOOD CELL COUNT (BEAKER) (test code = 761) 3.88 M/ L    4.63-6

.08    L             

 

             HEMOGLOBIN (BEAKER) (test code = 410) 11.5 GM/DL   13.7-17.5    L  

           

 

             HEMATOCRIT (BEAKER) (test code = 411) 34.0 %       40.1-51.0    L  

           

 

             MEAN CORPUSCULAR VOLUME (BEAKER) (test code = 753) 87.6 fL      79.

0-92.2                  

 

             MEAN CORPUSCULAR HEMOGLOBIN (BEAKER) (test code = 751) 29.6 pg     

 25.7-32.2                  

 

                    MEAN CORPUSCULAR HEMOGLOBIN CONC (BEAKER) (test code = 752) 

33.8 GM/DL          32.3-36.5

                                                     

 

             RED CELL DISTRIBUTION WIDTH (BEAKER) (test code = 412) 13.3 %      

 11.6-14.4                  

 

             PLATELET COUNT (BEAKER) (test code = 756) 178 K/CU MM  150-450     

               

 

             MEAN PLATELET VOLUME (BEAKER) (test code = 754) 10.2 fL      9.4-12

.4                   

 

             NUCLEATED RED BLOOD CELLS (BEAKER) (test code = 413) 0 /100 WBC   0

-0                        

 

             NEUTROPHILS RELATIVE PERCENT (BEAKER) (test code = 429) 65 %       

                             

 

             LYMPHOCYTES RELATIVE PERCENT (BEAKER) (test code = 430) 22 %       

                             

 

             MONOCYTES RELATIVE PERCENT (BEAKER) (test code = 431) 9 %          

                           

 

             EOSINOPHILS RELATIVE PERCENT (BEAKER) (test code = 432) 2 %        

                             

 

             BASOPHILS RELATIVE PERCENT (BEAKER) (test code = 437) 0 %          

                           

 

             NEUTROPHILS ABSOLUTE COUNT (BEAKER) (test code = 670) 5.98 K/ L    

1.78-5.38    H             

 

             LYMPHOCYTES ABSOLUTE COUNT (BEAKER) (test code = 414) 2.01 K/ L    

1.32-3.57                  

 

             MONOCYTES ABSOLUTE COUNT (BEAKER) (test code = 415) 0.86 K/ L    0.

30-0.82    H             

 

             EOSINOPHILS ABSOLUTE COUNT (BEAKER) (test code = 416) 0.14 K/ L    

0.04-0.54                  

 

             BASOPHILS ABSOLUTE COUNT (BEAKER) (test code = 417) 0.02 K/ L    0.

01-0.08                  

 

             IMMATURE GRANULOCYTES-RELATIVE PERCENT (BEAKER) (test code = 2801) 

2 %          0-1          H             





POCT-GLUCOSE [id number] 22:25:00* 



             Test Item    Value        Reference Range Interpretation Comments

 

             POC-GLUCOSE METER (BEAKER) (test code = 1538) 136 mg/dL      

     H            TESTED AT [facility]

 [address] [facility] [address]





POCT-GLUCOSE [id number] 19:26:00* 



             Test Item    Value        Reference Range Interpretation Comments

 

             POC-GLUCOSE METER (BEAKER) (test code = 1538) 114 mg/dL      

     H            TESTED AT [facility]

 [address] [facility] [address]





POCT-GLUCOSE [id number] 15:02:00* 



             Test Item    Value        Reference Range Interpretation Comments

 

             POC-GLUCOSE METER (BEAKER) (test code = 1538) 141 mg/dL      

     H            TESTED AT [facility]

 [address] [facility] [address]





BLOOD [id number] 09:31:00* 



             Test Item    Value        Reference Range Interpretation Comments

 

             CULTURE (BEAKER) (test code = 1095)                           A    

        From Aerobic And Anaerobic Bottles 

Same organism has been isolated from cultures(s) of the same body site and 
collection date. Repeat identification and susceptibility testing performed only
 after consultation with the clinical microbiology laboratory.Refer to previous 
culture ofStaphylococcus aureus

 

                          GRAM STAIN RESULT (BEAKER) (test code = 1123) From aer

obic bottle only: gram 

positive cocci in clusters                                          

 

                          GRAM STAIN RESULT (BEAKER) (test code = 185414) From a

naerobic bottle only: gram

 positive cocci in clusters                                          





BLOOD [id number] 09:29:00* 



             Test Item    Value        Reference Range Interpretation Comments

 

             CULTURE (BEAKER) (test code = 1095)                                

         

 

             Clindamycin (test code = 10)                           S           

  

 

             Erythromycin (test code = 4)                           S           

  

 

             Linezolid (test code = 40)                           S             

 

             Oxacillin (test code = 14)                           S             

 

             Rifampin (test code = 43)                           S             

 

             Tetracycline (test code = 2)                           S           

  

 

             Trimethoprim + Sulfamethoxazole (test code = 47)                   

        S             

 

             Vancomycin (test code = 13)                           S            

 

 

             CULTURE (BEAKER) (test code = 1095)                           A    

        From Aerobic And Anaerobic Bottles 

Staphylococcus aureus

 

                          GRAM STAIN RESULT (BEAKER) (test code = 1123) From aer

obic bottle only: gram 

positive cocci in clusters                                          





METHICILLIN-SUSCEPTIBLE STAPH. AUREUS (MSSA) DETECTEDStaphylococcus aureus DETEC
KELLE MecA NOT DETECTEDFirst line therapy: cefazolin or nafcillin (nafcillin prefe
rred if Central Nervous System  Infection)[facility] policy 
mandates Infectious Disease consultation for all patients with Staph. aureus bac
teremia.Other organisms and resistance markers not contained in this PCR panel c
annot be excluded and follow-up of traditional culture results is required.  Thi
s sample was tested at the [facility] using the Bio[employer]
joyce FilmArray Blood Culture ID Panel. This test is FDA cleared for in vitro diag
nostic use and has been verified and approved by the [facility]
 laboratory for clinical use. Reference Range: Not DetectedPOCT-GLUCOSE METER
2018 08:31:00* 



             Test Item    Value        Reference Range Interpretation Comments

 

             POC-GLUCOSE METER (BEAKER) (test code = 1538) 100 mg/dL      

                  TESTED AT [facility]

 [address] [facility] [address]





[id number] 07:59:00* 



             Test Item    Value        Reference Range Interpretation Comments

 

             PHOSPHORUS (BEAKER) (test code = 604) 2.2 mg/dL    2.3-4.7      L  

           





[id number] 07:59:00* 



             Test Item    Value        Reference Range Interpretation Comments

 

             MAGNESIUM (BEAKER) (test code = 627) 1.2 mg/dL    1.6-2.6      L   

          





BASIC METABOLIC [id number] 07:59:00* 



             Test Item    Value        Reference Range Interpretation Comments

 

             SODIUM (BEAKER) (test code = 381) 138 meq/L    136-145             

       

 

             POTASSIUM (BEAKER) (test code = 379) 3.4 meq/L    3.5-5.1      L   

          

 

             CHLORIDE (BEAKER) (test code = 382) 98 meq/L                 

         

 

             CO2 (BEAKER) (test code = 355) 30 meq/L     22-29        H         

    

 

             BLOOD UREA NITROGEN (BEAKER) (test code = 354) 13 mg/dL     7-21   

                    

 

             CREATININE (BEAKER) (test code = 358) 0.59 mg/dL   0.57-1.25       

           

 

             GLUCOSE RANDOM (BEAKER) (test code = 652) 78 mg/dL           

               

 

             CALCIUM (BEAKER) (test code = 697) 9.3 mg/dL    8.4-10.2           

        

 

             EGFR (BEAKER) (test code = 1092) 135 mL/min/1.73 sq m              

             ESTIMATED GFR IS NOT 

AS ACCURATE AS CREATININE CLEARANCE IN PREDICTING GLOMERULAR FILTRATION RATE. 
ESTIMATED GFR IS NOT APPLICABLE FOR DIALYSIS PATIENTS.





HEPATIC FUNCTION [id number] 07:59:00* 



             Test Item    Value        Reference Range Interpretation Comments

 

             TOTAL PROTEIN (BEAKER) (test code = 770) 6.6 gm/dL    6.0-8.3      

              

 

             ALBUMIN (BEAKER) (test code = 1145) 2.9 g/dL     3.5-5.0      L    

         

 

             BILIRUBIN TOTAL (BEAKER) (test code = 377) 0.9 mg/dL    0.2-1.2    

                

 

             BILIRUBIN DIRECT (BEAKER) (test code = 706) 0.5 mg/dL    0.1-0.5   

                 

 

             ALKALINE PHOSPHATASE (BEAKER) (test code = 346) 162 U/L      

       H             

 

             AST (SGOT) (BEAKER) (test code = 353) 70 U/L       5-34         H  

           

 

             ALT (SGPT) (BEAKER) (test code = 347) 61 U/L       6-55         H  

           





PROTHROMBIN TIME/[id number] 07:43:00* 



             Test Item    Value        Reference Range Interpretation Comments

 

             PROTIME (BEAKER) (test code = 759) 15.1 seconds 11.7-14.7    H     

        

 

             INR (BEAKER) (test code = 370) 1.2          <=5.9                  

    





RECOMMENDED COUMADIN/WARFARIN INR THERAPY RANGESSTANDARD DOSE: 2.0 - 3.0   Inclu
marilyn: PROPHYLAXIS for venous thrombosis, systemic embolization; TREATMENT for helio
ous thrombosis and/or pulmonary embolus.HIGH RISK: Target INR is 2.5-3.5 for pat
ients with mechanical heart valves.POCT-GLUCOSE METER2018-01-15 21:31:00* 



             Test Item    Value        Reference Range Interpretation Comments

 

             POC-GLUCOSE METER (BEJELENA) (test code = 1538) 168 mg/dL      

     H            TESTED AT [facility]

 [address] [facility] [address]





RAD, CHEST, 1 VIEW, NON DEPT2018-01-15 17:18:00Reason for exam:->check picc 
placement Should this be performed at the bedside?->YesFINAL REPORT PATIENT ID: 
  [id number]  AP view of the chest dated 1/15/2018 CLINICAL INFORMATION: check 
picc placement Comment:  Heart is normal in size. Pulmonary vasculature is 
unremarkable. Subsegmental atelectasis is seen both lung bases. The rest of 
lungs are clear. No pulmonary infiltrate or pleural effusion is present. A left 
PICC line is present with tip seen in the superior vena cava. Signed: Sanjiv Hwangeport Verified Date/Time:  01/15/2018 17:18:19 Reading Location: [facility]
 [id number] Consult Reading Room      Electronically signed by: SANJIV HWANG M.D. on
 01/15/2018 05:18 PM POCT-GLUCOSE METER2018-01-15 17:15:00* 



             Test Item    Value        Reference Range Interpretation Comments

 

             POC-GLUCOSE METER (BEAKER) (test code = 1538) 204 mg/dL      

     H            TESTED AT [facility]

 [address] [facility] [address]





MISCELLANEOUS LAB ORDER2018-01-15 14:38:00* 



             Test Item    Value        Reference Range Interpretation Comments

 

             SCAN RESULT (test code = 6503427)                                  

       





Result comments: METHICILLIN-SUSCEPTIBLE STAPH. AUREUS (MSSA) DETECTED Staphyloc
occus aureus DETECTED MecA NOT DETECTED First line therapy: cefazolin or nafcill
in (nafcillin preferred if Central Nervous System  Infection) [facility] policy mandates Infectious Disease consultation for all patients wi
th Staph. aureus bacteremia. Other organisms and resistance markers not containe
d in this PCR panel cannot be excluded and follow-up of traditional culture resu
lts is required.   This sample was tested at the [facility] Clinical Microbiology Lab
oratory using the [employer] Blood Culture ID Panel. This test is FDA victor manuel
ared for in vitro diagnostic use and has been verified and approved by the [facility]
 Clinical Microbiology laboratory for clinical use. Reference Range: Not Detecte
d BODY FLUID CULTURE + GRAM STAIN2018-01-15 12:28:00* 



             Test Item    Value        Reference Range Interpretation Comments

 

             CULTURE (BEAKER) (test code = 1095)                                

         

 

             Clindamycin (test code = 10)                           S           

  

 

             Erythromycin (test code = 4)                           S           

  

 

             Linezolid (test code = 40)                           S             

 

             Nitrofurantoin (test code = 23)                           S        

     

 

             Oxacillin (test code = 14)                           S             

 

             Rifampin (test code = 43)                           S             

 

             Tetracycline (test code = 2)                           S           

  

 

             Trimethoprim + Sulfamethoxazole (test code = 47)                   

        S             

 

             Vancomycin (test code = 13)                           S            

 

 

             CULTURE (BEAKER) (test code = 1095)                           A    

        Staphylococcus aureus

 

                          GRAM STAIN RESULT (BEAKER) (test code = 1123) From aer

obic bottle only: gram 

positive cocci in clusters                                          





POCT-GLUCOSE METER2018-01-15 11:57:00* 



             Test Item    Value        Reference Range Interpretation Comments

 

             POC-GLUCOSE METER (BEAKER) (test code = 1538) 176 mg/dL      

     H            TESTED AT [facility]

 [address] [facility] [address]





POCT-GLUCOSE METER2018-01-15 08:08:00* 



             Test Item    Value        Reference Range Interpretation Comments

 

             POC-GLUCOSE METER (BEAKER) (test code = 1538) 147 mg/dL      

     H            TESTED AT [facility]

 [address] [facility] [address]





PHOSPHORUS2018-01-15 06:12:00* 



             Test Item    Value        Reference Range Interpretation Comments

 

             PHOSPHORUS (BEAKER) (test code = 604) 2.3 mg/dL    2.3-4.7         

           





MAGNESIUM2018-01-15 06:12:00* 



             Test Item    Value        Reference Range Interpretation Comments

 

             MAGNESIUM (BEAKER) (test code = 627) 1.2 mg/dL    1.6-2.6      L   

          





BASIC METABOLIC PANEL2018-01-15 06:12:00* 



             Test Item    Value        Reference Range Interpretation Comments

 

             SODIUM (BEAKER) (test code = 381) 139 meq/L    136-145             

       

 

             POTASSIUM (BEAKER) (test code = 379) 3.0 meq/L    3.5-5.1      L   

          

 

             CHLORIDE (BEAKER) (test code = 382) 96 meq/L            L    

         

 

             CO2 (BEAKER) (test code = 355) 34 meq/L     22-29        H         

    

 

             BLOOD UREA NITROGEN (BEAKER) (test code = 354) 14 mg/dL     7-21   

                    

 

             CREATININE (BEAKER) (test code = 358) 0.63 mg/dL   0.57-1.25       

           

 

             GLUCOSE RANDOM (BEAKER) (test code = 652) 115 mg/dL          

 H             

 

             CALCIUM (BEAKER) (test code = 697) 9.2 mg/dL    8.4-10.2           

        

 

             EGFR (BEAKER) (test code = 1092) 126 mL/min/1.73 sq m              

             ESTIMATED GFR IS NOT 

AS ACCURATE AS CREATININE CLEARANCE IN PREDICTING GLOMERULAR FILTRATION RATE. 
ESTIMATED GFR IS NOT APPLICABLE FOR DIALYSIS PATIENTS.





HEPATIC FUNCTION PANEL2018-01-15 06:12:00* 



             Test Item    Value        Reference Range Interpretation Comments

 

             TOTAL PROTEIN (BEAKER) (test code = 770) 6.4 gm/dL    6.0-8.3      

              

 

             ALBUMIN (BEAKER) (test code = 1145) 2.9 g/dL     3.5-5.0      L    

         

 

             BILIRUBIN TOTAL (BEAKER) (test code = 377) 1.0 mg/dL    0.2-1.2    

                

 

             BILIRUBIN DIRECT (BEAKER) (test code = 706) 0.6 mg/dL    0.1-0.5   

   H             

 

             ALKALINE PHOSPHATASE (BEAKER) (test code = 346) 104 U/L      

                     

 

             AST (SGOT) (BEAKER) (test code = 353) 24 U/L       5-34            

           

 

             ALT (SGPT) (BEAKER) (test code = 347) 28 U/L       6-55            

           





CBC W/PLT COUNT & AUTO DIFFERENTIAL2018-01-15 06:02:00* 



             Test Item    Value        Reference Range Interpretation Comments

 

             WHITE BLOOD CELL COUNT (BEAKER) (test code = 775) 5.4 K/ L     3.5-

10.5                   

 

             RED BLOOD CELL COUNT (BEAKER) (test code = 761) 3.79 M/ L    4.63-6

.08    L             

 

             HEMOGLOBIN (BEAKER) (test code = 410) 11.3 GM/DL   13.7-17.5    L  

           

 

             HEMATOCRIT (BEAKER) (test code = 411) 34.0 %       40.1-51.0    L  

           

 

             MEAN CORPUSCULAR VOLUME (BEAKER) (test code = 753) 89.7 fL      79.

0-92.2                  

 

             MEAN CORPUSCULAR HEMOGLOBIN (BEAKER) (test code = 751) 29.8 pg     

 25.7-32.2                  

 

                    MEAN CORPUSCULAR HEMOGLOBIN CONC (BEAKER) (test code = 752) 

33.2 GM/DL          32.3-36.5

                                                     

 

             RED CELL DISTRIBUTION WIDTH (BEAKER) (test code = 412) 13.4 %      

 11.6-14.4                  

 

             PLATELET COUNT (BEAKER) (test code = 756) 156 K/CU MM  150-450     

               

 

             MEAN PLATELET VOLUME (BEAKER) (test code = 754) 10.2 fL      9.4-12

.4                   

 

             NUCLEATED RED BLOOD CELLS (BEAKER) (test code = 413) 0 /100 WBC   0

-0                        

 

             NEUTROPHILS RELATIVE PERCENT (BEAKER) (test code = 429) 61 %       

                             

 

             LYMPHOCYTES RELATIVE PERCENT (BEAKER) (test code = 430) 28 %       

                             

 

             MONOCYTES RELATIVE PERCENT (BEAKER) (test code = 431) 9 %          

                           

 

             EOSINOPHILS RELATIVE PERCENT (BEAKER) (test code = 432) 1 %        

                             

 

             BASOPHILS RELATIVE PERCENT (BEAKER) (test code = 437) 0 %          

                           

 

             NEUTROPHILS ABSOLUTE COUNT (BEAKER) (test code = 670) 3.27 K/ L    

1.78-5.38                  

 

             LYMPHOCYTES ABSOLUTE COUNT (BEAKER) (test code = 414) 1.53 K/ L    

1.32-3.57                  

 

             MONOCYTES ABSOLUTE COUNT (BEAKER) (test code = 415) 0.51 K/ L    0.

30-0.82                  

 

             EOSINOPHILS ABSOLUTE COUNT (BEAKER) (test code = 416) 0.03 K/ L    

0.04-0.54    L             

 

             BASOPHILS ABSOLUTE COUNT (BEAKER) (test code = 417) 0.00 K/ L    0.

01-0.08    L             

 

             IMMATURE GRANULOCYTES-RELATIVE PERCENT (BEAKER) (test code = 2801) 

1 %          0-1                        





PROTHROMBIN TIME/INR2018-01-15 05:55:00* 



             Test Item    Value        Reference Range Interpretation Comments

 

             PROTIME (BEAKER) (test code = 759) 17.6 seconds 11.7-14.7    H     

        

 

             INR (BEAKER) (test code = 370) 1.5          <=5.9                  

    





RECOMMENDED COUMADIN/WARFARIN INR THERAPY RANGESSTANDARD DOSE: 2.0 - 3.0   Inclu
marilyn: PROPHYLAXIS for venous thrombosis, systemic embolization; TREATMENT for helio
ous thrombosis and/or pulmonary embolus.HIGH RISK: Target INR is 2.5-3.5 for pat
ients with mechanical heart valves.POCT-GLUCOSE [id number] 21:24:00* 



             Test Item    Value        Reference Range Interpretation Comments

 

             POC-GLUCOSE METER (BEAKER) (test code = 1538) 180 mg/dL      

     H            TESTED AT [facility]

 [address] [facility] [address]





POCT-GLUCOSE [id number] 17:27:00* 



             Test Item    Value        Reference Range Interpretation Comments

 

             POC-GLUCOSE METER (BEAKER) (test code = 1538) 147 mg/dL      

     H            TESTED AT [place] [id number] [facility] [address]





URINE [id number] 13:01:00* 



             Test Item    Value        Reference Range Interpretation Comments

 

             CULTURE (BEAKER) (test code = 1095) No growth                      

         





BODY FLUID [id number] 12:52:00* 



             Test Item    Value        Reference Range Interpretation Comments

 

             CRYSTALS, BODY FLUID (BEAKER) (test code = 2165) No crystals seen. 

                           

 

                          [facility]-PATHOLOGIST-940 (BEAKER) (test code = 2607) Mered ith Reyes, MD (electronic 

signature)                                                   





POCT-GLUCOSE [id number] 11:27:00* 



             Test Item    Value        Reference Range Interpretation Comments

 

             POC-GLUCOSE METER (BEAKER) (test code = 1538) 122 mg/dL      

     H            TESTED AT [facility]

 [address] [facility] [address]





BASIC METABOLIC [id number] 06:13:00* 



             Test Item    Value        Reference Range Interpretation Comments

 

             SODIUM (BEAKER) (test code = 381) 139 meq/L    136-145             

       

 

             POTASSIUM (BEAKER) (test code = 379) 2.6 meq/L    3.5-5.1      LL  

          

 

             CHLORIDE (BEAKER) (test code = 382) 98 meq/L                 

         

 

             CO2 (BEAKER) (test code = 355) 30 meq/L     22-29        H         

    

 

             BLOOD UREA NITROGEN (BEAKER) (test code = 354) 21 mg/dL     7-21   

                    

 

             CREATININE (BEAKER) (test code = 358) 0.63 mg/dL   0.57-1.25       

           

 

             GLUCOSE RANDOM (BEAKER) (test code = 652) 105 mg/dL          

               

 

             CALCIUM (BEAKER) (test code = 697) 9.4 mg/dL    8.4-10.2           

        

 

             EGFR (BEAKER) (test code = 1092) 126 mL/min/1.73 sq m              

             ESTIMATED GFR IS NOT 

AS ACCURATE AS CREATININE CLEARANCE IN PREDICTING GLOMERULAR FILTRATION RATE. 
ESTIMATED GFR IS NOT APPLICABLE FOR DIALYSIS PATIENTS.





[id number] 06:04:00* 



             Test Item    Value        Reference Range Interpretation Comments

 

             PHOSPHORUS (BEAKER) (test code = 604) 1.7 mg/dL    2.3-4.7      L  

           





[id number] 06:04:00* 



             Test Item    Value        Reference Range Interpretation Comments

 

             MAGNESIUM (BEAKER) (test code = 627) 1.4 mg/dL    1.6-2.6      L   

          





HEPATIC FUNCTION [id number] 06:04:00* 



             Test Item    Value        Reference Range Interpretation Comments

 

             TOTAL PROTEIN (BEAKER) (test code = 770) 6.8 gm/dL    6.0-8.3      

              

 

             ALBUMIN (BEAKER) (test code = 1145) 3.1 g/dL     3.5-5.0      L    

         

 

             BILIRUBIN TOTAL (BEAKER) (test code = 377) 1.0 mg/dL    0.2-1.2    

                

 

             BILIRUBIN DIRECT (BEAKER) (test code = 706) 0.5 mg/dL    0.1-0.5   

                 

 

             ALKALINE PHOSPHATASE (BEAKER) (test code = 346) 106 U/L      

                     

 

             AST (SGOT) (BEAKER) (test code = 353) 34 U/L       5-34            

           

 

             ALT (SGPT) (BEAKER) (test code = 347) 29 U/L       6-55            

           





PROTHROMBIN TIME/[id number] 05:44:00* 



             Test Item    Value        Reference Range Interpretation Comments

 

             PROTIME (BEAKER) (test code = 759) 14.9 seconds 11.7-14.7    H     

        

 

             INR (BEAKER) (test code = 370) 1.2          <=5.9                  

    





RECOMMENDED COUMADIN/WARFARIN INR THERAPY RANGESSTANDARD DOSE: 2.0 - 3.0   Inclu
marilyn: PROPHYLAXIS for venous thrombosis, systemic embolization; TREATMENT for helio
ous thrombosis and/or pulmonary embolus.HIGH RISK: Target INR is 2.5-3.5 for pat
ients with mechanical heart valves.POCT-GLUCOSE [id number] 20:55:00* 



             Test Item    Value        Reference Range Interpretation Comments

 

             POC-GLUCOSE METER (BEAKER) (test code = 1538) 168 mg/dL      

     H            TESTED AT [facility]

 [address] [facility] [address]





POCT-GLUCOSE [id number] 18:20:00* 



             Test Item    Value        Reference Range Interpretation Comments

 

             POC-GLUCOSE METER (BEAKER) (test code = 1538) 153 mg/dL      

     H            TESTED AT [address] [address]





URINALYSIS W/ [id number] 14:33:00* 



             Test Item    Value        Reference Range Interpretation Comments

 

             COLOR (BEAKER) (test code = 470) Yellow                            

      

 

             CLARITY (BEAKER) (test code = 469) Clear                           

        

 

             SPECIFIC GRAVITY UA (BEAKER) (test code = 468) 1.011        1.001-1

.035                

 

             PH UA (BEAKER) (test code = 467) 6.5          5.0-8.0              

      

 

             PROTEIN UA (BEAKER) (test code = 464) Negative     Negative        

           

 

             GLUCOSE UA (BEAKER) (test code = 365) Negative     Negative        

           

 

             KETONES UA (BEAKER) (test code = 371) Negative     Negative        

           

 

             BILIRUBIN UA (BEAKER) (test code = 462) Negative     Negative      

             

 

             BLOOD UA (BEAKER) (test code = 461) Negative     Negative          

         

 

             NITRITE UA (BEAKER) (test code = 465) Negative     Negative        

           

 

             LEUKOCYTE ESTERASE UA (BEAKER) (test code = 466) Negative     Negat

christina                   

 

             UROBILINOGEN UA (BEAKER) (test code = 463) 3.0 mg/dL    0.2-1.0    

  H             

 

             RBC UA (BEAKER) (test code = 519) 0 /HPF                           

       

 

             WBC UA (BEAKER) (test code = 520) < /HPF                           

       

 

             SOURCE(BEAKER) (test code = 2795) Urine, Clean Catch               

             





SEDIMENTATION [id number] 14:06:00* 



             Test Item    Value        Reference Range Interpretation Comments

 

             SEDIMENTATION RATE, ERYTHROCYTE (BEAKER) (test code = 766) 114 mm/H

R    0-40         H             





POCT-GLUCOSE [id number] 13:44:00* 



             Test Item    Value        Reference Range Interpretation Comments

 

             POC-GLUCOSE METER (BEAKER) (test code = 1538) 122 mg/dL      

     H            TESTED AT [facility]

 [address] [facility] [address]





BODY FLUID CELL COUNT WITH [id number] 13:34:00* 



             Test Item    Value        Reference Range Interpretation Comments

 

             APPEARANCE FLUID (BEAKER) (test code = 510) Moderately Bloody Clear

        A             

 

             COLOR FLUID (BEAKER) (test code = 511) Orange       Colorless, Stra

w A             

 

             RBC FLUID (BEAKER) (test code = 513) 00615 /cu mm <=1          H   

          

 

             ADJUSTED WBC FLUID (BEAKER) (test code = 1691) 61449 /cu mm <=5    

      H             

 

             LINING CELLS (BEAKER) (test code = 1590) 0 /cu mm     <=1          

              

 

             NEUTROPHILS FLUID (BEAKER) (test code = 1656) 86 %                 

                   

 

             LYMPHS FLUID (BEAKER) (test code = 488) 4 %                        

             

 

             MONO/MACROPHAGE FLUID (BEAKER) (test code = 489) 10 %              

                      

 

             EOSINOPHILS FLUID (BEAKER) (test code = 491) 0 %                   

                  

 

             BASO FLUID (BEAKER) (test code = 492) 0 %                          

           

 

             CONTAINER BODY FLUID (BEAKER) (test code = 2873) EDTA Tube         

                      





CBC W/PLT COUNT & AUTO [id number] 11:29:00* 



             Test Item    Value        Reference Range Interpretation Comments

 

             WHITE BLOOD CELL COUNT (BEAKER) (test code = 775) 9.9 K/ L     3.5-

10.5                   

 

             RED BLOOD CELL COUNT (BEAKER) (test code = 761) 3.82 M/ L    4.63-6

.08    L             

 

             HEMOGLOBIN (BEAKER) (test code = 410) 11.4 GM/DL   13.7-17.5    L  

           

 

             HEMATOCRIT (BEAKER) (test code = 411) 32.9 %       40.1-51.0    L  

           

 

             MEAN CORPUSCULAR VOLUME (BEAKER) (test code = 753) 86.1 fL      79.

0-92.2                  

 

             MEAN CORPUSCULAR HEMOGLOBIN (BEAKER) (test code = 751) 29.8 pg     

 25.7-32.2                  

 

                    MEAN CORPUSCULAR HEMOGLOBIN CONC (BEAKER) (test code = 752) 

34.7 GM/DL          32.3-36.5

                                                     

 

             RED CELL DISTRIBUTION WIDTH (BEAKER) (test code = 412) 13.7 %      

 11.6-14.4                  

 

             PLATELET COUNT (BEAKER) (test code = 756) 193 K/CU MM  150-450     

               

 

             MEAN PLATELET VOLUME (BEAKER) (test code = 754) 10.3 fL      9.4-12

.4                   

 

             NUCLEATED RED BLOOD CELLS (BEAKER) (test code = 413) 0 /100 WBC   0

-0                        

 

             NEUTROPHILS RELATIVE PERCENT (BEAKER) (test code = 429) 76 %       

                             

 

             LYMPHOCYTES RELATIVE PERCENT (BEAKER) (test code = 430) 15 %       

                             

 

             MONOCYTES RELATIVE PERCENT (BEAKER) (test code = 431) 8 %          

                           

 

             EOSINOPHILS RELATIVE PERCENT (BEAKER) (test code = 432) 0 %        

                             

 

             BASOPHILS RELATIVE PERCENT (BEAKER) (test code = 437) 0 %          

                           

 

             NEUTROPHILS ABSOLUTE COUNT (BEAKER) (test code = 670) 7.58 K/ L    

1.78-5.38    H             

 

             LYMPHOCYTES ABSOLUTE COUNT (BEAKER) (test code = 414) 1.45 K/ L    

1.32-3.57                  

 

             MONOCYTES ABSOLUTE COUNT (BEAKER) (test code = 415) 0.78 K/ L    0.

30-0.82                  

 

             EOSINOPHILS ABSOLUTE COUNT (BEAKER) (test code = 416) 0.00 K/ L    

0.04-0.54    L             

 

             BASOPHILS ABSOLUTE COUNT (BEAKER) (test code = 417) 0.01 K/ L    0.

01-0.08                  

 

             IMMATURE GRANULOCYTES-RELATIVE PERCENT (BEAKER) (test code = 2801) 

1 %          0-1                        





POCT-GLUCOSE [id number] 09:49:00* 



             Test Item    Value        Reference Range Interpretation Comments

 

             POC-GLUCOSE METER (BEAKER) (test code = 1538) 154 mg/dL      

     H            TESTED AT [facility]

 [address] [facility] [address]





COMPREHENSIVE METABOLIC [id number] 06:10:00* 



             Test Item    Value        Reference Range Interpretation Comments

 

             TOTAL PROTEIN (BEAKER) (test code = 770) 8.0 gm/dL    6.0-8.3      

             Specimen moderately

 hemolyzed

 

             ALBUMIN (BEAKER) (test code = 1145) 3.3 g/dL     3.5-5.0      L    

        Specimen moderately 

hemolyzed

 

             ALKALINE PHOSPHATASE (BEAKER) (test code = 346) 124 U/L      

                     

 

             BILIRUBIN TOTAL (BEAKER) (test code = 377) 1.3 mg/dL    0.2-1.2    

  H            Specimen 

moderately hemolyzed

 

             SODIUM (BEAKER) (test code = 381) 138 meq/L    136-145             

       

 

             POTASSIUM (BEAKER) (test code = 379) 4.0 meq/L    3.5-5.1          

         Specimen moderately 

hemolyzed

 

             CHLORIDE (BEAKER) (test code = 382) 96 meq/L            L    

         

 

             CO2 (BEAKER) (test code = 355) 28 meq/L     22-29                  

    

 

             BLOOD UREA NITROGEN (BEAKER) (test code = 354) 33 mg/dL     7-21   

      H             

 

             CREATININE (BEAKER) (test code = 358) 0.83 mg/dL   0.57-1.25       

          Specimen moderately

 hemolyzed

 

             GLUCOSE RANDOM (BEAKER) (test code = 652) 189 mg/dL          

 H             

 

             CALCIUM (BEAKER) (test code = 697) 9.9 mg/dL    8.4-10.2           

        

 

             AST (SGOT) (BEAKER) (test code = 353) 73 U/L       5-34         H  

          Specimen moderately 

hemolyzed

 

             ALT (SGPT) (BEAKER) (test code = 347) 39 U/L       6-55            

          Specimen moderately  

hemolyzed

 

             EGFR (BEAKER) (test code = 1092) 91 mL/min/1.73 sq m               

            ESTIMATED GFR IS NOT AS

 ACCURATE AS CREATININE CLEARANCE IN PREDICTING GLOMERULAR FILTRATION RATE. 
ESTIMATED GFR IS NOT APPLICABLE FOR DIALYSIS PATIENTS.





C-REACTIVE [id number] 06:10:00* 



             Test Item    Value        Reference Range Interpretation Comments

 

             C-REACTIVE PROTEIN (BEAKER) (test code = 676) 18.92 mg/dL  0.00-0.5

0    H             





CBC W/PLT COUNT & AUTO [id number] 05:59:00* 



             Test Item    Value        Reference Range Interpretation Comments

 

             WHITE BLOOD CELL COUNT (BEAKER) (test code = 775) 13.0 K/ L    3.5-

10.5     H             

 

             RED BLOOD CELL COUNT (BEAKER) (test code = 761) 4.21 M/ L    4.63-6

.08    L             

 

             HEMOGLOBIN (BEAKER) (test code = 410) 12.6 GM/DL   13.7-17.5    L  

           

 

             HEMATOCRIT (BEAKER) (test code = 411) 36.3 %       40.1-51.0    L  

           

 

             MEAN CORPUSCULAR VOLUME (BEAKER) (test code = 753) 86.2 fL      79.

0-92.2                  

 

             MEAN CORPUSCULAR HEMOGLOBIN (BEAKER) (test code = 751) 29.9 pg     

 25.7-32.2                  

 

                    MEAN CORPUSCULAR HEMOGLOBIN CONC (BEAKER) (test code = 752) 

34.7 GM/DL          32.3-36.5

                                                     

 

             RED CELL DISTRIBUTION WIDTH (BEAKER) (test code = 412) 13.8 %      

 11.6-14.4                  

 

             PLATELET COUNT (BEAKER) (test code = 756) 248 K/CU MM  150-450     

               

 

             MEAN PLATELET VOLUME (BEAKER) (test code = 754) 10.5 fL      9.4-12

.4                   

 

             NUCLEATED RED BLOOD CELLS (BEAKER) (test code = 413) 0 /100 WBC   0

-0                        

 

             NEUTROPHILS RELATIVE PERCENT (BEAKER) (test code = 429) 82 %       

                             

 

             LYMPHOCYTES RELATIVE PERCENT (BEAKER) (test code = 430) 13 %       

                             

 

             MONOCYTES RELATIVE PERCENT (BEAKER) (test code = 431) 4 %          

                           

 

             EOSINOPHILS RELATIVE PERCENT (BEAKER) (test code = 432) 0 %        

                             

 

             BASOPHILS RELATIVE PERCENT (BEAKER) (test code = 437) 0 %          

                           

 

             NEUTROPHILS ABSOLUTE COUNT (BEAKER) (test code = 670) 10.63 K/ L   

1.78-5.38    H             

 

             LYMPHOCYTES ABSOLUTE COUNT (BEAKER) (test code = 414) 1.65 K/ L    

1.32-3.57                  

 

             MONOCYTES ABSOLUTE COUNT (BEAKER) (test code = 415) 0.57 K/ L    0.

30-0.82                  

 

             EOSINOPHILS ABSOLUTE COUNT (BEAKER) (test code = 416) 0.00 K/ L    

0.04-0.54    L             

 

             BASOPHILS ABSOLUTE COUNT (BEAKER) (test code = 417) 0.01 K/ L    0.

01-0.08                  

 

             IMMATURE GRANULOCYTES-RELATIVE PERCENT (LINDSAYAKER) (test code = 2801) 

1 %          0-1

## 2020-09-13 NOTE — EMERGENCY DEPARTMENT NOTE
History of Present Illnes


History of Present Illness


Chief Complaint:  Abdominal Complaints


History of Present Illness


This is a 74 year old  male arrives to the ED with complaints of right 

groin pain that began suddenly. Patient states pain is worsening despite the 

fact that he's had a hernia for 2 years the pain has come on suddenly today. .








Chief Complaint Comment 73 Y/O MALE PT AAOX3 PRESENTS TO THE ER C/O PAIN TO RT 

GROIN ONSET THIS AFTERNOON AROUND 1730; PT HAS INGUINAL HERNIA FOR THE PAST 2 

YEARS BUT EXPERIENCED PAIN TODAY; PT REPROTS STIFFNESS TO SITE; PT STATES PAIN 

WORSENS WHEN AMBULATING; NAD NOTED AT THIS TIME; V/S/S; SKIN WARM, DRY AND COLOR

WNL FOR PT.


Historian:  Patient


Arrival Mode:  Car


Onset (how long ago):  hour(s)


Duration (how long):  hour(s)


Progression:  worsening


Chronicity:  new


Relieving factors:  none


Exacerbating factors:  none





Past Medical/Family History


Physician Review


I have reviewed the patient's past medical and family history.  Any updates have

been documented here.





Past Medical History


Recent Fever:  No


Clinical Suspicion of Infectio:  No


New/Unexplained Change in Ment:  No


Past Medical History:  Hypertension, GERD, Chronic Back Pain


Other Medical History:  


PROSTATE CA





MIRYAM TO BONE





MUSCLE PAIN





ESOPHAGEAL STRICTURE





PROTEIN PROBLEM





INGUINAL HERNIA


Past Surgical History:  T&A


Other Surgery:  


CIRC





TURP





CATARACTS





Social History


Physically hurt or threatened:  No





Review of Systems


Review of Systems


Constitutional:  Reports no symptoms


EENTM:  Reports no symptoms


Cardiovascular:  Reports no symptoms


Respiratory:  Reports no symptoms


Gastrointestinal:  Reports no symptoms


Genitourinary:  Reports as per HPI, Reports other (scrotal pain )


Musculoskeletal:  Reports no symptoms


Integumentary:  Reports no symptoms


Neurological:  Reports no symptoms


Psychological:  Reports no symptoms


Endocrine:  Reports no symptoms


Hematological/Lymphatic:  Reports no symptoms





Physical Exam


Related Data


Allergies:  


Coded Allergies:  


     midazolam (Verified  Allergy, Unknown, 9/13/20)


Uncoded Allergies:  


     MIDALOZAM (Adverse Reaction, Unknown, paradoxical reaction, 9/5/20)


Triage Vital Signs





Vital Signs








  Date Time  Temp Pulse Resp B/P (MAP) Pulse Ox O2 Delivery O2 Flow Rate FiO2


 


9/13/20 19:36 98.5 109 20 142/77 98 Room Air  








Vital signs reviewed:  Yes





Physical Exam


CONSTITUTIONAL





Constitutional:  Present well-developed, Present well-nourished


HENT


HENT:  Present normocephalic, Present atraumatic, Present oropharynx 

clear/moist, Present nose normal


HENT L/R:  Present left ext ear normal, Present right ext ear normal


EYES





Eyes:  Reports PERRL, Reports conjunctivae normal


NECK


Neck:  Present ROM normal


PULMONARY


Pulmonary:  Present effort normal, Present breath sounds normal


CARDIOVASCULAR





Cardiovascular:  Present regular rhythm, Present heart sounds normal, Present 

capillary refill normal, Present normal rate


GASTROINTESTINAL





Abdominal:  Present soft, Present nontender, Present bowel sounds normal


GENITOURINARY





Genitourinary:  Present other (right testicular pain )


SKIN


Skin:  Present warm, Present dry


MUSCULOSKELETAL





Musculoskeletal:  Present ROM normal


NEUROLOGICAL





Neurological:  Present alert, Present oriented x 3, Present no gross motor or 

sensory deficits


PSYCHOLOGICAL


Psychological:  Present mood/affect normal, Present judgement normal





Results


Laboratory


Lab results reviewed:  Yes


Laboratory comments





Laboratory Tests








Test


 9/14/20


00:00 9/13/20


21:10 9/13/20


21:08


 


Lactic Acid Level


 2.7 mmol/L


(0.5-2.0) 


 





 


Urine Color


 


 Yellow


(YELLOW) 





 


Urine Clarity  Clear (CLEAR)  


 


Urine pH  6.5 (5 - 7)  


 


Urine Specific Gravity


 


 1.010


(1.010-1.025) 





 


Urine Protein


 


 Negative


(NEGATIVE) 





 


Urine Glucose (UA)


 


 Negative


(NEGATIVE) 





 


Urine Ketones


 


 Negative


(NEGATIVE) 





 


Urine Blood


 


 Negative


(NEGATIVE) 





 


Urine Nitrite


 


 Negative


(NEGATIVE) 





 


Urine Bilirubin


 


 Negative


(NEGATIVE) 





 


Urine Urobilinogen


 


 0.2 mg/dL (0.2


- 1) 





 


Urine Leukocyte Esterase


 


 Negative


(NEGATIVE) 





 


Urine RBC  0-5 /HPF (0-5)  


 


Urine WBC  0-5 /HPF (0-5)  


 


Urine Epithelial Cells


 


 Few /LPF


(NONE) 





 


Urine Bacteria


 


 Few /HPF


(NONE) 





 


White Blood Count


 


 


 6.07 x10e3/uL


(4.8-10.8)


 


Red Blood Count


 


 


 4.99 x10e6/uL


(4.3-5.7)


 


Hemoglobin


 


 


 13.3 g/dL


(14.0-18.0)


 


Hematocrit


 


 


 41.8 %


(38.2-49.6)


 


Mean Corpuscular Volume


 


 


 83.8 fL


(81-99)


 


Mean Corpuscular Hemoglobin


 


 


 26.7 pg


(28-32)


 


Mean Corpuscular Hemoglobin


Concent 


 


 31.8 g/dL


(31-35)


 


Red Cell Distribution Width


 


 


 15.9 %


(11.7-14.4)


 


Platelet Count


 


 


 188 x10e3/uL


(140-360)


 


Neutrophils (%) (Auto)


 


 


 75.5 %


(38.7-80.0)


 


Lymphocytes (%) (Auto)


 


 


 17.3 %


(18.0-39.1)


 


Monocytes (%) (Auto)


 


 


 4.6  %


(4.4-11.3)


 


Eosinophils (%) (Auto)


 


 


 1.8 %


(0.0-6.0)


 


Basophils (%) (Auto)


 


 


 0.5 %


(0.0-1.0)


 


Neutrophils # (Auto)   4.6 (2.1-6.9) 


 


Lymphocytes # (Auto)   1.1 (1.0-3.2) 


 


Monocytes # (Auto)   0.3 (0.2-0.8) 


 


Eosinophils # (Auto)   0.1 (0.0-0.4) 


 


Basophils # (Auto)   0.0 (0.0-0.1) 


 


Absolute Immature Granulocyte


(auto 


 


 0.02 x10e3/uL


(0-0.1)


 


Sodium Level


 


 


 140 mmol/L


(136-145)


 


Potassium Level


 


 


 3.8 mmol/L


(3.5-5.1)


 


Chloride Level


 


 


 105 mmol/L


()


 


Carbon Dioxide Level


 


 


 19 mmol/L


(22-29)


 


Anion Gap


 


 


 19.8 mmol/L


(8-16)


 


Blood Urea Nitrogen   7 mg/dL (7-26) 


 


Creatinine


 


 


 0.79 mg/dL


(0.72-1.25)


 


Estimat Glomerular Filtration


Rate 


 


 > 60 ML/MIN


(60-)


 


BUN/Creatinine Ratio   9 (6-25) 


 


Glucose Level


 


 


 116 mg/dL


()


 


Calcium Level


 


 


 10.0 mg/dL


(8.4-10.2)


 


Total Bilirubin


 


 


 1.0 mg/dL


(0.2-1.2)


 


Aspartate Amino Transf


(AST/SGOT) 


 


 37 IU/L (5-34) 





 


Alanine Aminotransferase


(ALT/SGPT) 


 


 36 IU/L (0-55) 





 


Alkaline Phosphatase


 


 


 84 IU/L


()


 


Creatine Kinase MB


 


 


 4.00 ng/mL


(0-5.0)


 


Troponin I


 


 


 0.006 ng/mL


(0-0.300)


 


Total Protein


 


 


 7.7 g/dL


(6.5-8.1)


 


Albumin


 


 


 4.5 g/dL


(3.5-5.0)


 


Globulin


 


 


 3.2 g/dL


(2.3-3.5)


 


Albumin/Globulin Ratio   1.4 (0.8-2.0) 


 


Lipase   24 U/L (8-78) 











Imaging


Imaging results reviewed:  Yes


Impressions


IMPRESSION: 





1.  Large fat and bowel containing right inguinal hernia, mild edema in the


hernia sac is suggestive of strangulation. Slightly dilated loops of small


bowel proximal to the hernia is suspicious for low-grade obstruction. The


hernia contains distal small bowel and cecum and appendix.





2.  Hepatic cirrhosis and portal hypertension with gastroesophageal and


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 Trace perihepatic ascites.





3.  Esophagitis. 





4.  Mild peripancreatic edema can be due to acute interstitial edematous


pancreatitis.





5.  Prostatomegaly.





6.  Cholelithiasis without cystic duct obstruction.





Signed by: Guicho Luis DO on 9/13/2020 10:48 PM





Critical Care Time


Total Critical Care Time (min):  75


Critical care time exclusive o:  separately billable procedures


Critcal care necessary due to:  shock





Assessment & Plan


Medical Decision Making


MDM


Findings of strangulated hernia discussed with Dr. Diaz at 2332, patient 

hypotensive, diaphoretic. 


Concerns of impending septic shock noted at 2332, blood cultures, lactic acid 

drawn. Broad spectrum antibiotics given. 





Patient will likely need immediate surgical intervention, the preceding info was

relayed to general surgery on-call.





Bedside volume reassessment done after fluids with improvement noted. 





Pt continue to complain of pain Dr. Diaz reconsulted at 2355, OR team 

activated





Lactic acid noted to be 2.7, 2200 mL fluid needed weight-based


 


Rpt lactic acid noted to be 3.0





Lactic acid repeated again and noted to be 2.9





Assessment & Plan


Final Impression:  


(1) Septic shock


(2) Inguinal hernia with strangulation


Depart Disposition:  ADMITTED


Last Vital Signs











  Date Time  Temp Pulse Resp B/P (MAP) Pulse Ox O2 Delivery O2 Flow Rate FiO2


 


9/13/20 19:36 98.5 109 20 142/77 98 Room Air  








Home Meds


Reported Medications


Leuprolide Acetate (LUPRON DEPOT) 45 Mg Syringekit, 45 MG IM UD


   EVERY 6 MONTHS


   9/6/20


[Vitamin D]   No Conflict Check, 1000 UNITS PO DAILY


   9/6/20


Tamsulosin Hcl* (FLOMAX*) 0.4 Mg Cap, 0.4 MG PO DAILY, #30 CAP


   9/6/20


Prednisone (PREDNISONE) 5 Mg Tablet, 5 MG PO BID, TAB


   9/6/20


Potassium Chloride (POTASSIUM CHLORIDE) 20 Meq/100 Ml Soln, 15 ML PO UD, 

PIGGYBACK


   EVERY OTHR DAY


   9/6/20


Magnesium Oxide (MAGNESIUM OXIDE) 400 Mg Tablet, 400 MG PO BID, TAB


   9/6/20


Lisinopril (LISINOPRIL) 10 Mg Tablet, 10 MG PO DAILY, #30 TAB


   9/6/20


Famotidine (FAMOTIDINE) 20 Mg Tab, 20 MG PO BID, #30 TAB


   9/6/20


Diphenhydramine Hcl (DIPHENHYDRAMINE HCL) 25 Mg Tablet, 25 MG PO PRN, #30 CAP


   9/6/20


Cyanocobalamin (Vitamin B-12) (VITAMIN B-12) 1,000 Mcg Tab.subl, 500 MCG SQ 

DAILY


   9/6/20


Ubidecarenone (CO Q-10) 100 Mg Capsule, 100 MG PO UD


   EVERY 3 DAYS


   9/6/20


Calcium Carbonate/Vitamin D3 (OS-EMERSON 500+D TABLET) 1 Each Tablet, 1 TAB PO BID, 

TAB


   9/6/20











RACHEL PARIKH,             Sep 13, 2020 19:54

## 2020-09-14 VITALS — DIASTOLIC BLOOD PRESSURE: 81 MMHG | SYSTOLIC BLOOD PRESSURE: 146 MMHG

## 2020-09-14 VITALS — SYSTOLIC BLOOD PRESSURE: 155 MMHG | DIASTOLIC BLOOD PRESSURE: 81 MMHG

## 2020-09-14 VITALS — SYSTOLIC BLOOD PRESSURE: 147 MMHG | DIASTOLIC BLOOD PRESSURE: 67 MMHG

## 2020-09-14 VITALS — SYSTOLIC BLOOD PRESSURE: 145 MMHG | DIASTOLIC BLOOD PRESSURE: 68 MMHG

## 2020-09-14 VITALS — DIASTOLIC BLOOD PRESSURE: 79 MMHG | SYSTOLIC BLOOD PRESSURE: 133 MMHG

## 2020-09-14 VITALS — DIASTOLIC BLOOD PRESSURE: 72 MMHG | SYSTOLIC BLOOD PRESSURE: 144 MMHG

## 2020-09-14 VITALS — DIASTOLIC BLOOD PRESSURE: 69 MMHG | SYSTOLIC BLOOD PRESSURE: 139 MMHG

## 2020-09-14 VITALS — DIASTOLIC BLOOD PRESSURE: 75 MMHG | SYSTOLIC BLOOD PRESSURE: 143 MMHG

## 2020-09-14 VITALS — SYSTOLIC BLOOD PRESSURE: 138 MMHG | DIASTOLIC BLOOD PRESSURE: 77 MMHG

## 2020-09-14 VITALS — SYSTOLIC BLOOD PRESSURE: 117 MMHG | DIASTOLIC BLOOD PRESSURE: 64 MMHG

## 2020-09-14 VITALS — SYSTOLIC BLOOD PRESSURE: 148 MMHG | DIASTOLIC BLOOD PRESSURE: 49 MMHG

## 2020-09-14 VITALS — DIASTOLIC BLOOD PRESSURE: 79 MMHG | SYSTOLIC BLOOD PRESSURE: 139 MMHG

## 2020-09-14 VITALS — DIASTOLIC BLOOD PRESSURE: 72 MMHG | SYSTOLIC BLOOD PRESSURE: 143 MMHG

## 2020-09-14 VITALS — SYSTOLIC BLOOD PRESSURE: 139 MMHG | DIASTOLIC BLOOD PRESSURE: 78 MMHG

## 2020-09-14 VITALS — DIASTOLIC BLOOD PRESSURE: 78 MMHG | SYSTOLIC BLOOD PRESSURE: 139 MMHG

## 2020-09-14 VITALS — SYSTOLIC BLOOD PRESSURE: 137 MMHG | DIASTOLIC BLOOD PRESSURE: 76 MMHG

## 2020-09-14 VITALS — SYSTOLIC BLOOD PRESSURE: 137 MMHG | DIASTOLIC BLOOD PRESSURE: 90 MMHG

## 2020-09-14 VITALS — DIASTOLIC BLOOD PRESSURE: 71 MMHG | SYSTOLIC BLOOD PRESSURE: 133 MMHG

## 2020-09-14 VITALS — DIASTOLIC BLOOD PRESSURE: 77 MMHG | SYSTOLIC BLOOD PRESSURE: 131 MMHG

## 2020-09-14 VITALS — SYSTOLIC BLOOD PRESSURE: 134 MMHG | DIASTOLIC BLOOD PRESSURE: 68 MMHG

## 2020-09-14 LAB
ANION GAP SERPL CALC-SCNC: 16.8 MMOL/L (ref 8–16)
BASOPHILS # BLD AUTO: 0 10*3/UL (ref 0–0.1)
BASOPHILS NFR BLD AUTO: 0.2 % (ref 0–1)
BUN SERPL-MCNC: 7 MG/DL (ref 7–26)
BUN/CREAT SERPL: 10 (ref 6–25)
CALCIUM SERPL-MCNC: 8.7 MG/DL (ref 8.4–10.2)
CHLORIDE SERPL-SCNC: 108 MMOL/L (ref 98–107)
CK SERPL-CCNC: 87 IU/L (ref 30–200)
CO2 SERPL-SCNC: 20 MMOL/L (ref 22–29)
DEPRECATED NEUTROPHILS # BLD AUTO: 8.1 10*3/UL (ref 2.1–6.9)
EGFRCR SERPLBLD CKD-EPI 2021: > 60 ML/MIN (ref 60–?)
EOSINOPHIL # BLD AUTO: 0 10*3/UL (ref 0–0.4)
EOSINOPHIL NFR BLD AUTO: 0.2 % (ref 0–6)
ERYTHROCYTE [DISTWIDTH] IN CORD BLOOD: 16 % (ref 11.7–14.4)
GLUCOSE SERPLBLD-MCNC: 176 MG/DL (ref 74–118)
HCT VFR BLD AUTO: 38.7 % (ref 38.2–49.6)
HGB BLD-MCNC: 12.1 G/DL (ref 14–18)
LYMPHOCYTES # BLD: 0.3 10*3/UL (ref 1–3.2)
LYMPHOCYTES NFR BLD AUTO: 3.5 % (ref 18–39.1)
MCH RBC QN AUTO: 26.9 PG (ref 28–32)
MCHC RBC AUTO-ENTMCNC: 31.3 G/DL (ref 31–35)
MCV RBC AUTO: 86 FL (ref 81–99)
MONOCYTES # BLD AUTO: 0.5 10*3/UL (ref 0.2–0.8)
MONOCYTES NFR BLD AUTO: 5.2 % (ref 4.4–11.3)
NEUTS SEG NFR BLD AUTO: 90.3 % (ref 38.7–80)
PLATELET # BLD AUTO: 142 X10E3/UL (ref 140–360)
POTASSIUM SERPL-SCNC: 3.8 MMOL/L (ref 3.5–5.1)
RBC # BLD AUTO: 4.5 X10E6/UL (ref 4.3–5.7)
SODIUM SERPL-SCNC: 141 MMOL/L (ref 136–145)

## 2020-09-14 PROCEDURE — 0DS80ZZ REPOSITION SMALL INTESTINE, OPEN APPROACH: ICD-10-PCS | Performed by: SURGERY

## 2020-09-14 PROCEDURE — 0YQ50ZZ REPAIR RIGHT INGUINAL REGION, OPEN APPROACH: ICD-10-PCS | Performed by: SURGERY

## 2020-09-14 RX ADMIN — DEXTROSE AND SODIUM CHLORIDE SCH MLS/HR: 5; 450 INJECTION, SOLUTION INTRAVENOUS at 14:08

## 2020-09-14 RX ADMIN — TAZOBACTAM SODIUM AND PIPERACILLIN SODIUM SCH MLS/HR: 375; 3 INJECTION, SOLUTION INTRAVENOUS at 06:00

## 2020-09-14 RX ADMIN — DEXTROSE AND SODIUM CHLORIDE SCH MLS/HR: 5; 450 INJECTION, SOLUTION INTRAVENOUS at 03:53

## 2020-09-14 RX ADMIN — TAZOBACTAM SODIUM AND PIPERACILLIN SODIUM SCH MLS/HR: 375; 3 INJECTION, SOLUTION INTRAVENOUS at 18:54

## 2020-09-14 RX ADMIN — TAMSULOSIN HYDROCHLORIDE SCH MG: 0.4 CAPSULE ORAL at 20:42

## 2020-09-14 RX ADMIN — FAMOTIDINE SCH MG: 20 TABLET, FILM COATED ORAL at 18:40

## 2020-09-14 RX ADMIN — TAZOBACTAM SODIUM AND PIPERACILLIN SODIUM SCH MLS/HR: 375; 3 INJECTION, SOLUTION INTRAVENOUS at 11:10

## 2020-09-14 RX ADMIN — Medication PRN MG: at 14:25

## 2020-09-14 RX ADMIN — PREDNISONE SCH MG: 5 TABLET ORAL at 18:40

## 2020-09-14 NOTE — XMS REPORT
Clinical Summary

                             Created on: 2020



Anastacio Robbins

External Reference #: [id number]

: 1945

Sex: Male



Demographics





                          Address                   [address]  [address]

 

                          Home Phone                [phone]

 

                          Preferred Language        English

 

                          Marital Status            Unknown

 

                          Mormon Affiliation     Protestant

 

                          Race                      White

 

                          Ethnic Group              Non-





Author





                          Author                    [facility]

 

                          Address                   Unknown

 

                          Phone                     Unavailable







Support





                Name            Relationship    Address         Phone

 

                    Radha Dolan      [employer]                [address]  [address]                [phone]







Care Team Providers





                    Care Team Member Name Role                Phone

 

                    Tiffany Cleary MD PCP                 Unavailable







Allergies





                                        Comments



                 Active Allergy  Reactions       Severity        Noted Date 

 

                                        



Paradoxical reaction



                           Midazolam                 2018 







Medications





                          End Date                  Status



              Medication   Sig          Dispensed    Refills      Start  



                                         Date  

 

                                                    Active



                     diphenhydrAMINE     Take 25 mg by       0   



                           (BENADRYL) 25 mg tablet   mouth every 3     



                                         (three)     



                                         hours.     

 

                                                    Active



                     magnesium oxide-Mg AA  Take 250 mg         0   



                           chelate (MAGNESIUM, AMINO  by mouth 3     



                           ACID CHELATE,) 133 mg Tab  (three) times     



                                         daily.     

 

                                                    Active



                     coenzyme Q10 (CO Q-10)  Take 100 mg         0   



                           100 mg capsule            by mouth     



                                         daily.     

 

                                                    Active



                     amLODIPine (NORVASC) 10  Take 10 mg by       0   



                           MG tabletIndications:     mouth daily.     



                                         high blood pressure      

 

                                                    Active



                 tamsulosin (FLOMAX) 0.4  Take 0.4 mg     0               /

201  



                     mg Cp24 24 hr capsule  by mouth            7  



                                         daily.     

 

                                                    Active



                 calcium carbonate-vitamin  Take 1 tablet   0                 



                     D3 (OSCAL-D) 500    by mouth 2          6  



                           mg(1,250mg) -200 unit per  (two) times     



                           tablet                    daily.     

 

                                                    Active



                 hydroCHLOROthiazide  Take 25 mg by   0               / 

 



                     (HYDRODIURIL) 25 MG  mouth daily.        7  



                                         tablet      

 

                                                    Active



                     aspirin 325 MG tablet  Take 325 mg         0   



                                         by mouth     



                                         daily.     

 

                                                    Active



                     potassium chloride 25 mEq  Take by             0   



                           Pack                      mouth.     

 

                                                    Active



                     bicalutamide (CASODEX) 50  Take 50 mg by       0   



                           MG tablet                 mouth 3     



                                         (three) times     



                                         daily.     

 

                                                    Active



                     denosumab (XGEVA) 120  Inject              0   



                           mg/1.7 mL (70 mg/mL)      subcutaneousl     



                           injection                 y.     

 

                                                    Active



                     ranitidine (ZANTAC) 150  Take 150 mg         0   



                           MG capsule                by mouth 2     



                                         (two) times     



                                         daily.     

 

                                                    Active



                     traMADol (ULTRAM) 50 mg  Take 50 mg by       0   



                           tablet                    mouth every 6     



                                         (six) hours     



                                         as needed for     



                                         Pain.     

 

                                                    Active



                     docusate sodium (COLACE)  Take 100 mg         0   



                           100 MG capsule            by mouth 2     



                                         (two) times     



                                         daily.     

 

                                                    Active



                     leuprolide acetate  Inject              0   



                           (LUPRON DEPOT, 6 MONTH,   intramuscular     



                           IM)                       ly.     







Active Problems





 



                           Problem                   Noted Date

 

 



                           Achalasia                 2018

 

 



                           Septic arthritis          01/15/2018

 

 



                           Pyogenic arthritis of left knee joint, due to unspeci

fied organism  2018

 

 



                           BPH (benign prostatic hyperplasia)  05/15/2015







Social History





                                        Date



                 Tobacco Use     Types           Packs/Day       Years Used 

 

                                        Quit: 2018



                     Former Smoker       0.25                10 

 

    



                                         Smokeless Tobacco: Never   



                                         Used   







                                        Tobacco Cessation: Counseling Given: Yes









   



                 Alcohol Use     Drinks/Week     oz/Week         Comments

 

   



                     Yes                 3 Cans of           1.8 



                                         beer  







 



                           Sex Assigned at Birth     Date Recorded

 

 



                                         Not on file 







                                        Industry



                           Job Start Date            Occupation 

 

                                        Not on file



                           Not on file               Not on file 







                                        Travel End



                           Travel History            Travel Start 

 





                                         No recent travel history available.







Last Filed Vital Signs

Not on file



Plan of Treatment





Not on file



Results

Not on fileafter 2019



Insurance





     



            Payer      Benefit    Subscriber ID  Type       Phone      Address



                                         Plan /    



                                         Group    

 

     



                     [facility]          [employer]   



                                         MEDICARE    



                                         ADV    







     



            Guarantor Name  Account    Relation to  Date of    Phone      Billin

g Address



                     Type                Patient             Birth  

 

     



            ItzelAnastacio mccullough  Personal/F  Self       1945  [phone]  3

[address]



                     [email or web address]               (Home)              [place] [address]







Advance Directives





For more information, please contact:



[facility]



[address]



[place] [address] [phone]







                          Date Inactivated          Comments



                           Code Status               Date Activated  

 

                          2018  7:46 PM         



                           Full Code                 2018  9:44 AM  







  



                           This code status was determined by:  Patient 







                                                     

 

                          2015  3:57 PM         



                           Full Code                 5/15/2015  4:23 PM  







  



                           This code status was determined by:  Patient

## 2020-09-14 NOTE — OPERATIVE REPORT
DATE OF PROCEDURE:  09/14/2020

 

SURGEON:  Nadeem Diaz MD

 

PREOPERATIVE DIAGNOSIS:  Incarcerated right inguinal hernia with intestinal obstruction.

 

POSTOPERATIVE DIAGNOSIS:  Incarcerated right inguinal hernia with intestinal obstruction.

 

PROCEDURE:  Repair of incarcerated right inguinal hernia with release of small bowel

obstruction. 

 

ASSISTANT:  None.

 

ANESTHESIA:  General.

 

INDICATIONS AND FINDINGS:  The patient is a 74-year-old male who presents with

complaints of severe pain in the right groin area.  Evaluation revealed a large right

inguinal hernia with signs of strangulation and obstruction surgery.  The patient is

found to have an indirect right inguinal hernia containing edematous cecum and terminal

ileum with a bowel proximally was being obstructed.  The bowel was edematous and

congested, but all appeared viable. 

 

TECHNIQUE:  After adequate general endotracheal anesthesia, patient in supine position,

the right groin area and abdomen were prepped and draped in a sterile fashion with

ChloraPrep solution.  Transverse incision made in the right inguinal area, carried down

through subcutaneous tissue and Rene's fascia until the external oblique fascia was

seen.  This was opened in direction of its fibers through the external ring.  The

ilioinguinal nerve was not immediately seen.  There was a large herniated mass and

spermatic cord was dissected free from the floor of the inguinal canal.  The hernia sac

was opened at this point, and there was some clear fluid in the hernia sac and bowel.

The bowel was delivered from the scrotum into the inguinal area.  The cecum was

delivered up first, this appeared viable, but congested and as the bowel was brought up

into the inguinal canal, it was then reduced into the peritoneal cavity.  The bowel was

noted to be somewhat edematous with bowel proximal to where the hernia was being noted

to be dilated.  Once all the bowel was reduced in the peritoneal cavity, the spermatic

cord was dissected free from the floor of the inguinal canal.  The hernia sac which had

been open was closed with a running suture of 3-0 Vicryl.  The hernia sac was dissected

free from the spermatic cord.  It was also noted to be a small direct hernia.  This

hernia sac extended all the way into the scrotum and the hernia sac was divided distal

to the area where it had previously been opened, the distal sac left in place while the

divided sac was ligated with 2-0 Vicryl and then freed from the spermatic cord and

reduced the internal ring.  The hernia was then repaired directly using 0 Prolene.  The

transversalis fascia was then imbricated to itself running from medial to lateral up to

the internal ring, imbricated such that it was large enough for Carolina clamp to pass

through the internal ring, but nothing larger and a 2nd layer was placed imbricating the

shelving edge of inguinal ligament to the conjoined tendon, this time running from

lateral to medial.  Hemostasis of the wound was seen to be adequate.  The wound was

infiltrated with 0.5% Marcaine.  The spermatic cord was returned to its normal position.

 The external oblique fascia was then closed with a running suture of 2-0 Vicryl.  Care

was taken not to entrap any nerves, Rene's fascia was closed.  A 19-[place] Efrain drain

was placed through the wound into the scrotum.  This brought out through a separate stab

wound incision.  The subcutaneous tissue and Rene's fascia were closed with a running

suture of 3-0 Vicryl.  Skin was closed with staples.  Sterile dressing was applied.  The

patient tolerated the procedure well.  Estimated blood loss was 40 mL.  There were 

no complications.  All counts were correct.  The patient was taken to the recovery room

in satisfactory condition. 

 

 

 

 

______________________________

MD LAKHWINDER Moore/MODL

D:  09/14/2020 02:42:59

T:  09/14/2020 16:12:53

Job #:  [id number]

 

cc:            Shira Fernandes MD

## 2020-09-14 NOTE — NUR
patient ambulated to bathroom without difficulty.  room air.  used walker due to monitor and 
syed catheter with patient to help minimize risk of fall.  pt tolerated ambulation well.  
patient is now sitting in chair. patient is in good spirits. spoke with children this 
morning.  pain is tolerable thus far. pt denies needing pain medication at this time.  
discussed pain management, safety, fall risk, fluids and hydration.  pt verbalized 
understanding.  patient demonstrated use of IS with respiratory at bedside earlier.

## 2020-09-14 NOTE — CONSULTATION
DATE OF CONSULTATION:  09/14/2020  

 

HISTORY OF PRESENT ILLNESS:  The patient is a 74-year-old male, who presents with

complaints of pain in the right groin, he has had no hernia for a long time, says the

pain suddenly got worse today.  He denies nausea, vomiting, however the pain is

persisted.  He came to the emergency room, he had some mild hypotension which responded

to fluids.  CT of the abdomen and pelvis done, which revealed large right inguinal

hernia with questionable strangulation. 

 

PAST MEDICAL HISTORY:  Significant for hypertension, history of carcinoma of the

prostate.  He has had previous TURP.  He is treated with Lupron. 

 

MEDICATIONS:  His other medications listed in the chart.

 

ALLERGIES:  MIDAZOLAM.

 

FAMILY HISTORY:  Noncontributory.

 

SOCIAL HISTORY:  The patient does not smoke cigarettes or drink alcohol.

 

REVIEW OF SYSTEMS:

Significant for chronic dysphagia possibly secondary to achalasia.

 

PHYSICAL EXAMINATION:

GENERAL:  The patient is awake and alert, in no distress. 

VITAL SIGNS:  Heart rate around 110, blood pressure is normal. 

HEENT:  Sclerae is nonicteric. 

NECK:  Supple.  No masses. 

LUNGS:  Equal breath sounds are clear bilaterally. 

CARDIAC:  Slightly irregular rhythm with no murmur. 

ABDOMEN:  Mildly distended.  There is a large right inguinal hernia present, which is

not reducible. 

EXTREMITIES:  Have no edema. 

NEUROLOGIC:  Grossly intact.

LABORATORY TESTS:  White blood count is normal 6000 with a mild left-shifted

differential, hemoglobin and hematocrit are normal.  Chemistries, bicarbonate level of

19, otherwise were normal. 

 

ASSESSMENT:  A 74-year-old male with incarcerated right inguinal hernia.

 

PLAN:  Repair of the hernia to be done today with possible repair with mesh.  Procedure

was explained to the patient including risks, benefits and alternatives.  He

understands.  He has had the opportunity to ask questions. 

 

Thank you for asking me to see Mr. Robbins.

 

 

 

 

______________________________

MD LAKHWINDER Moore/DESEAN

D:  09/14/2020 00:51:13

T:  09/14/2020 15:50:31

Job #:  [id number]

## 2020-09-14 NOTE — HISTORY AND PHYSICAL
PCP:  Dr. Tiffany Cleary at [facility].

 

CHIEF COMPLAINT:  Right inguinal hernia and pain.

 

HISTORY OF PRESENT ILLNESS:  This is a 74-year-old male with past medical history of

hypertension, prostate cancer, asthma, and esophageal dysfunction with regurgitation,

presented to the ER with complaints of right groin pain.  He reports was doing fine when

he suddenly felt right groin severe pain.  He denies any chest pain, nausea, vomiting,

shortness of breath, diarrhea, fever, or chills.  Upon arrival to the ER, he was noted

to be hypotensive with lactic acid of 2.7.  CT abdomen and pelvis showed large bowel

containing right inguinal hernia.  Mild edema in the hernia is suggestive of

strangulation, slightly dilated loops of small bowel proximal to the hernia is

suspicious for low grade obstruction.  The hernia contains distal small bowel and cecum

and appendix.  Hepatic cirrhosis and portal hypertension with gastroesophageal and

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

cystic duct obstruction.  Surgical team was consulted emergently and underwent hernia

repair early this morning. 

 

PAST MEDICAL HISTORY:  

1. Hypertension.

2. Prostate cancer.

3. Asthma.

4. Esophageal dysfunction with regurgitation.

 

PAST SURGICAL HISTORY:  TURP.

 

FAMILY MEDICAL HISTORY:  Reports mother and father  with heart attack.

 

SOCIAL HISTORY:  He denies any tobacco, alcohol, or illicit drug use.  He is retired and

lives by himself. 

 

ALLERGIES:  MIDAZOLAM.

 

REVIEW OF SYSTEMS:

Ten system reviewed and negative except as reported in HPI.

 

PHYSICAL EXAMINATION:

VITAL SIGNS:  Temperature 98.7, pulse is 102, respirations 24, blood pressure 155/81,

pulse ox is 99% on room air. 

GENERAL:  No acute distress. 

HEENT:  Normocephalic and atraumatic. 

NECK:  Supple. 

LUNGS:  Clear to auscultation. 

CARDIOVASCULAR:  Regular rate and rhythm. 

GI:  Soft and nontender.  Right groin area with dressing intact. 

NEUROLOGIC:  Alert, awake, and oriented x3. 

:  Molina in place. 

MUSCULOSKELETAL:  Moves all extremities. 

SKIN:  Dry. 

PSYCH:  Calm.

LABORATORY DATA:  WBC 8.94, hemoglobin 12.1, hematocrit 38.7, and platelets 142.  Sodium

141, potassium 3.8, CO2 of 20, Anion gap 15.8, BUN is 7, creatinine 0.71, estimated GFR

is greater than 60.  Lactic acid is 2.9, calcium is 8.7.  Coronavirus PCR is negative.

UA unremarkable.  CT abdomen and pelvis as reported in HPI. 

 

IMPRESSION:  

1. Right inguinal hernia with strangulation, status post hernia repair.  Continue Zosyn,

IV fluids, and pain management as needed. 

2. Hypertension.  We will continue home dose of lisinopril and treat as needed.

3. History of prostate cancer.  We will resume Flomax.

4. Esophagitis.  We will continue on Pepcid.

5. History of asthma.  Albuterol as needed.

6. Deep venous thrombosis prophylaxis.  No anticoagulation due to recent surgery.

7. Lactic acidosis.  Does not appear to be septic.  We will continue with IV fluids and

trend. 

 

PLAN:  To continue with IV fluids and antibiotics.  He may transfer out of ICU and start

clear liquids. 

 

 

Dictated by CURT Russell

 

______________________________

Shira Fernandes MD

 

MY/MODL

D:  2020 21:01:12

T:  2020 23:26:38

Job #:  [id number]

## 2020-09-14 NOTE — XMS REPORT
Continuity of Care Document

                             Created on: 2020



PARAS BAUTISTA

External Reference #: [id number]

: 1945

Sex: Male



Demographics





                          Address                   [address] [place]  [address]

 

                          Home Phone                [phone]

 

                          Preferred Language        English

 

                          Marital Status            Unknown

 

                          [employer] Affiliation     Unknown

 

                          Race                      Unknown

 

                                        Additional Race(s) 

White



 

                          Ethnic Group              Non-





Author





                          Author                    [facility]

t

 

                          Organization              [facility]

 

                          Address                   [address]

[place]  [address]



 

                          Phone                     Unavailable







Support





                Name            Relationship    Address         Phone

 

                    RADHA SONG    PRS                 [address]

ELIOT FLOOD MD                 [phone]

 

                    RADHA SONG    PRS                 [address]

ELIOT FLOOD MD                 [phone]

 

                    Radha Dolan    ECON                [address] [place]  [address]                [phone]

 

                    RADHA DOLAN    ECON                UNK

UN, UN  UN                              Unavailable







Care Team Providers





                    Care Team Member Name Role                Phone

 

                    PAULETTE PENA  PCP                 [phone]

 

                    KIM PARIKH   Attphys             Unavailable

 

                    Valentin SZYMANSKI,  [facility] Attphys             [phone]

 

                    MARIANELA KRISHNA Attphys             Unavailable

 

                    JL BREWER Admphys             Unavailable







Payers





           Payer Name Policy Type Policy Number Effective Date Expiration Date KIM leone

 

           [employer] Advantage            [id number]                

       [facility]







Problems





           Condition Name Condition Details Condition Category Status     Onset 

Date Resolution

Date            Last Treatment Date Treating Clinician Comments        Source

 

       Achalasia Achalasia Disease Active 2018 00:00:00                   

          [facility]

 

       Septic arthritis Septic arthritis Disease Active 2018-01-15 00:00:00     

                        [facility]

 

                          Pyogenic arthritis of left knee joint, due to unspecif

ied organism Pyogenic 

arthritis of left knee joint, due to unspecified organism Disease             Ac

tive              

2018 00:00:00                                                     [facility]

 

                BPH (benign prostatic hyperplasia) BPH (benign prostatic hyperpl

adrienne) Disease         

Active     2015-05-15 00:00:00                                             [facility]

 

       Chest pain        Problem Active                                    [facility]







Allergies, Adverse Reactions, Alerts





        Allergy Name Allergy Type Status  Severity Reaction(s) Onset Date Inacti

ve Date 

Treating Clinician        Comments                  Source

 

           MIDALOZAM  Propensity to adverse reactions Active                para

doxical reaction 

2020 00:00:00                                                 [facility]

 

        Midazolam Propensity to adverse reactions Active                  2018 00:00:00                 

Paradoxical reaction                    [facility]

 

       No Known Allergies DA     Active U             2018 00:00:00       

               [facility]







Social History





           Social Habit Start Date Stop Date  Quantity   Comments   Source

 

           Sex Assigned At Birth                                             [facility]

 

                    Cigarettes smoked current (pack per day) - Reported  00:00:00 

2018 00:00:00                                         [facility]

 

           Cigarette pack-years 2018 00:00:00 2018 00:00:00         

              [facility]

 

           History of tobacco use            2018 00:00:00 Current smoker 

           [facility]







                Smoking Status  Start Date      Stop Date       Source

 

                Former smoker   2018 00:00:00 2018 00:00:00 [facility]







Medications





             Ordered Medication Name Filled Medication Name Start Date   Stop Da

te    Current 

Medication? Ordering Clinician Indication Dosage     Frequency  Signature (SIG) 

Comments                  Components                Source

 

        docusate sodium (COLACE) 100 MG capsule         2018 13:14:40     

    Yes                     100mg   

Q.5D            Take 100 mg by mouth 2 (two) times daily.                       

          [facility]

 

        leuprolide acetate (LUPRON DEPOT, 6 MONTH, IM)         2018 13:14:

40         Yes                      

                          Inject intramuscularly.                           [facility]

 

       traMADol (ULTRAM) 50 mg tablet        2018 13:08:16        Yes     

             50mg          Take 50 mg

by mouth every 6 (six) hours as needed for Pain.                                

         [facility]

 

       ranitidine (ZANTAC) 150 MG capsule        2018 12:51:45        Yes 

                 150mg  Q.5D   

Take 150 mg by mouth 2 (two) times daily.                                       

  [facility]

 

       potassium chloride 25 mEq Pack        2018 12:51:44        Yes     

                           Take by 

mouth.                                                      [facility]

 

        bicalutamide (CASODEX) 50 MG tablet         2018 12:51:44         

Yes                     50mg    

[id number] Take 50 mg by mouth 3 (three) times daily.               

                  [facility]

 

             denosumab (XGEVA) 120 mg/1.7 mL (70 mg/mL) injection              2

 12:51:44              Yes

                                        Inject subcutaneously.                 C

[facility]

 

       aspirin 325 MG tablet        2018 11:32:53        Yes              

    325mg  QD     Take 325 mg by 

mouth daily.                                                [facility]

 

          amLODIPine (NORVASC) 10 MG tablet           2018 06:37:06       

    Yes                 high blood 

pressure   10mg       QD         Take 10 mg by mouth daily.                     

  [facility]

 

        hydroCHLOROthiazide (HYDRODIURIL) 25 MG tablet         2017 00:00:

00         Yes                     

25mg         QD           Take 25 mg by mouth daily.                           C

[facility]

 

        tamsulosin (FLOMAX) 0.4 mg Cp24 24 hr capsule         2017 00:00:0

0         Yes                     

.4mg         QD           Take 0.4 mg by mouth daily.                           

[facility]

 

                    calcium carbonate-vitamin D3 (OSCAL-D) 500 mg(1,250mg) -200 

unit per tablet                     

2016 00:00:00           Yes                           1{tbl}    Q.5D      

Take 1 tablet by mouth 2 (two) times 

daily.                                                      [facility]

 

       diphenhydrAMINE (BENADRYL) 25 mg tablet        2015 10:47:26       

 Yes                  25mg          

Take 25 mg by mouth every 3 (three) hours.                                      

   [facility]

 

                    magnesium oxide-Mg AA chelate (MAGNESIUM, AMINO ACID CHELATE

,) 133 mg Tab                     

2015 10:47:26           Yes                           250mg     [id number]

[id number] Take 250 mg by mouth 

3 (three) times daily.                                         [facility]

 

       coenzyme Q10 (CO Q-10) 100 mg capsule        2015 10:47:26        Y

es                  100mg  QD     

Take 100 mg by mouth daily.                                         [facility]

 

                          Calcium Carbonate/Vitamin D3 (Os-Guilherme 500+D Tablet) 1 E

ach TABLET Calcium 

Carbonate/Vitamin D3 (Os-Guilherme 500+D Tablet) 1 Each TABLET                 Yes    

                 1               Twice A

Day                                                         [facility]

 

                          Cyanocobalamin (Vitamin B-12) (Vitamin B-12) 1,000 Mcg

 TAB.SUBL Cyanocobalamin 

(Vitamin B-12) (Vitamin B-12) 1,000 Mcg TAB.SUBL             Yes               5

00         Daily             [facility]

 

     Diphenhydramine Hcl Diphenhydramine Hcl           Yes            25        

As Needed           [facility]

 

     Famotidine Famotidine           Yes            20        Twice A Day       

    [facility]

 

                          Leuprolide Acetate (Lupron Depot) 45 Mg SYRINGEKIT Romero

prolide Acetate (Lupron 

Depot) 45 Mg SYRINGEKIT             Yes               45          Use As Directe

d             [facility]

 

     Lisinopril Lisinopril           Yes            10        Daily           [facility]

[facility]

 

     Magnesium Oxide Magnesium Oxide           Yes            400       Twice A 

Day           [facility]

 

     Potassium Chloride Potassium Chloride           Yes            15        Us

e As Directed           [facility]

 

     Prednisone Prednisone           Yes            5         Twice A Day       

    [facility]

 

           Tamsulosin Hcl (Flomax*) 0.4 Mg CAP Tamsulosin Hcl (Flomax*) 0.4 Mg C

AP                       Yes        

                    .4                  Daily                         [facility]

 

                Ubidecarenone (Co Q-10) 100 Mg CAPSULE Ubidecarenone (Co Q-10) 1

00 Mg CAPSULE                 

        Yes                     100             Use As Directed                 

[facility]

 

     Vitamin D Vitamin D           Yes            1000      Daily           [facility]

 

      Amlodipine Besylate Amlodipine Besylate       2020 00:00:00 No      

          10          Daily 

                                                    [facility]







Vital Signs





             Vital Name   Observation Time Observation Value Comments     Source

 

             Body Temperature 2020 16:00:00 98.5 [degF]               [facility]

 

             Weight       2020 05:01:00 162.50 [lb_av]              [facility]

 

             BMI (Body Mass Index) 2020 05:01:00 26.2 kg/m2               

 [facility]







Procedures





                Procedure       Date / Time Performed Performing Clinician Sour

e

 

                Computed tomography of brain without radiopaque contrast 2020 00:00:00                 

[facility]







Plan of Care





             Planned Activity Planned Date Details      Comments     Source

 

             Instructions              Chest Pain - Noncardiac              [facility]

 

             Instructions              Dizziness                 [facility]







Encounters





             Start Date/Time End Date/Time Encounter Type Admission Type [facility]   Care Department Encounter ID    Source

 

             2020 23:42:00 2020 17:51:00 Discharged Inpatient (obs) 

             RACHEL PARIKH          [facility] [id number]    

I [facility]

 

           2019-10-16 10:42:13 2019-10-16 16:08:46 Office Visit            [facility] 

AMBULATORY          [id number]    

         







Results





           Test Description Test Time  Test Comments Results    Result Comments 

Source

 

                CT ABDOMEN/PELVIS W 2020 22:36:00                         

                              

                                                [facility]                        [address] [address]      Patient Name: PARAS BAUTISTA                                
MR #: [id number]                  : 1945                               
    Age/Sex: 74/M  Acct #: [id number]                              Req #: [id number]
[id number]  Adm Physician:                                                      
Ordered by: RACHEL PARIKH DO                         Report #: [id number]    
    Location: ER                                      Room/Bed:                 
  
________________________________________________________________________________

___________________    Procedure: [id number] CT/CT ABDOMEN/PELVIS W  Exam Date: 
20                            Exam Time:                              
                 REPORT STATUS: Signed    EXAM: CT Abdomen and Pelvis WITH 
contrast     INDICATION:      Right lower quadrant pain, hernia    COMPARISON: 
None.   TECHNIQUE: Abdomen and pelvis were scanned utilizing a multidetector 
helical   scanner from the lung base to the pubic symphysis after administration
 of IV   contrast. Coronal and sagittal reformations were obtained. Routine 
protocol was   performed. Scan was performed when during portal venous phase.   
         IV CONTRAST: 100 mL of Isovue 370        ORAL CONTRAST: None           
       COMPLICATIONS: None      RADIATION DOSE:        Total DLP: 458 mGy*cm    
    Estimated effective dose: (DLP x 0.015 x size factor) mSv        CTDIvol has
 been reviewed. It is below the limits set by the Radiation   Protocol Committee
 (RPC).        Dose modulation, iterative reconstruction, and/or weight based 
adjustment   of the mA/kV was utilized to reduce the radiation dose to as low as
 reasonably   achievable.       FINDINGS:      LINES and TUBES: None.      LOWER
 THORAX: The distal esophagus is filled with fluid and has circumferential   
wall thickening. Triple vessel coronary artery calcific atherosclerosis.       
HEPATOBILIARY: Lobular/nodular hepatic surface contour.  Caudate hypertrophy.   
Widened intrahepatic fissures.No focal hepatic lesions. No biliary ductal   
dilation.       GALLBLADDER: Tiny calcified gallstone.  No wall thickening.     
 SPLEEN: No splenomegaly.       PANCREAS: No focal masses or ductal dilatation. 
 Mild peripancreatic edema.      ADRENALS: No adrenal nodules          
KIDNEYS/URETERS: Kidneys enhance symmetrically.  No hydronephrosis. There are   
scattered too small to characterize hypodensites, likely benign.  No stones.    
  GI TRACT: Mild fluid distention of small bowel loops proximal to the right   
inguinal hernia, The hernia contains distal small bowel and cecum and appendix. 
  Edema within the mesentery of the herniated loops of bowel in the right   
inguinal hernia. No wall thickening.  Mild prominence of rectal mucosal   
vessels.  Appendix is normal.      PELVIC ORGANS/BLADDER: Unremarkable.      
LYMPH NODES: No lymphadenopathy.      VESSELS: Gastroesophageal varices.  Large 
collateral ectatic splenic vessels..   Arterial calcifications.      PERITONEUM 
/ RETROPERITONEUM: Trace fluid in the right inguinal hernia.      BONES: Chronic
 nonhealed nondisplaced right lower lumbar transverse process   fractures.      
SOFT TISSUES: Large fat and bowel containing right inguinal hernia. Mild amount 
  of fluid edema and congestion in the hernia sac.  The hernia contains distal  
 small bowel and cecum and appendix.          IMPRESSION:       1.  Large fat 
and bowel containing right inguinal hernia, mild edema in the   hernia sac is 
suggestive of strangulation. Slightly dilated loops of small   bowel proximal to
 the hernia is suspicious for low-grade obstruction. The   hernia contains 
distal small bowel and cecum and appendix.      2.  Hepatic cirrhosis and portal
 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 Trace 
perihepatic ascites.      3.  Esophagitis.       4.  Mild peripancreatic edema 
can be due to acute interstitial edematous   pancreatitis.      5.  
Prostatomegaly.      6.  Cholelithiasis without cystic duct obstruction.      
Signed by: Guicho Ortega DO on 2020 10:48 PM        Dictated By: GUICHO ORTEGA DO  Electronically Signed By: GUICHO ORTEGA DO on 20  
Transcribed By: CHELA on 20       COPY TO:   RACHEL PARIKH DO   
                                                     

 

                          Serum or plasma creatine kinase measurement (enzymatic

 activity/volume) 

2020 14:21:00                       

 

                                        Test Item

 

             Creatine Kinase (test code = 2157-6) 47                      

          





[facility] 
[facility] (mass/volume)2020 14:21:00* 



             Test Item    Value        Reference Range Interpretation Comments

 

             Creatine Kinase MB (test code = 89885-4) 2.10         0-5.0        

              





[facility] by highly 
sensitive enzyme [id number] 14:21:00* 



             Test Item    Value        Reference Range Interpretation Comments

 

             Troponin I (test code = 75871-5) 0.021        0-0.300              

      





[facility] triglyceride [facility] 
(mass/volume)2020 08:10:00* 



             Test Item    Value        Reference Range Interpretation Comments

 

             Triglycerides Level (test code = 2571-8) 179          0-149        

              





[facility] cholesterol [facility] 
(mass/volume)2020 08:10:00* 



             Test Item    Value        Reference Range Interpretation Comments

 

             Cholesterol Level (test code = 2093-3) 150          0-199          

            





Less than 200 mg/dL       Low [id number] - 239 mg/dL           Borderline [id number] m
g/dl and greater     High Risk                        [facility] cholesterol in LDL measurement (mass/volume)
2020 08:10:00* 



             Test Item    Value        Reference Range Interpretation Comments

 

             LDL Cholesterol (test code = 2089-1) 76                      

          





[facility] cholesterol in HDL 
measurement (mass/volume)2020 08:10:00* 



             Test Item    Value        Reference Range Interpretation Comments

 

             HDL Cholesterol (test code = 2085-9) 38           40-60            

          





[facility] total 
cholesterol/cholesterol in HDL mass [id number] 08:10:00* 



             Test Item    Value        Reference Range Interpretation Comments

 

             Cholesterol/HDL Ratio (test code = 9830-1) 3.9          3.9-4.7    

                





[facility] [id number] 21:45:00           
                                                                           [facility]                        [address] [address]      Patient Name: PARAS BAUTISTA           
                     MR #: [id number]                  : 1945          
                         Age/Sex: 74/M  Acct #: [id number]                    
          Req #: [id number]  Adm Physician:                                     
                 Ordered by: RACHEL PARIKH DO                         Report
 #: [id number]        Location: ER                                      Room/Bed:
                   ________________________________________________________
___________________________________________    Procedure: [id number] CT/CT BRAIN 
WO  Exam Date: 20                            Exam Time:               
                                REPORT STATUS: Signed    EXAMINATION: Head CT wi
thout contrast.           HISTORY:Dizziness and blurred vision.      COMPARISON:
None.   TECHNIQUE: Multidetector axial images were obtained from the foramen mag
num to   the vertex without contrast. The images were reconstructed using brain 
and bone   algorithms.  Thin section brain images were reformatted into coronal 
and   sagittal planes.   Dose modulation, iterative reconstruction, and/or weigh
t based adjustment of   the mA/kV was utilized to reduce the radiation dose to a
s low as reasonably   achievable.      Intravenous contrast: None        IMAGE Q
UALITY: Suboptimal evaluation particularly at the level of skull base   and post
erior fossa structures due to streak artifacts.      FINDINGS:       Skull/scalp
: No lytic or blastic. lesions.  No surgical changes.       Parenchyma: Nonspeci
fic bilateral frontoparietal patchy white matter   hypodensity are likely relate
d to small vessel ischemic changes.   No acute hemorrhage, mass or acute major v
ascular territorial infarct.       Arteries: No density suggestive of thrombosis
. Atherosclerotic   calcification in bilateral carotid siphon and V4 segment of 
the vertebral   arteries.          Dural sinuses: No abnormal density suggestive
 of thrombosis.        Ventricles: No hydrocephalus or displacement.       Extra
-axial spaces: No abnormal density.         Brain volume: Mild generalized age-r
elated cerebral volume loss.       Craniocervical junction: No mass, Chiari malf
ormation, or basilar   invagination.       Sella: No mass.        Paranasal/mast
oid sinuses: Imaged portions unremarkable.         IMPRESSION:   No acute intrac
ranial abnormality.      Mild generalized cerebral volume loss.      Mild suprat
entorial white matter microvascular ischemic changes.      Signed by: Dr. Mikey Steven M.D. on 2020 9:48 PM        Dictated By: ERICK STEVEN MD  Elec
tronically Signed By: ERICK STEVEN MD on 20  Transcribed By: SHANNON ECHEVARRIA on 20       COPY TO:   RACHEL PARIKH DO         Blood 
leukocytes automated count (number/volume)2020 21:40:00* 



             Test Item    Value        Reference Range Interpretation Comments

 

             White Blood Count (test code = 6690-2) 7.86         4.8-10.8       

            





[facility] automated count 
(number/volume)2020 21:40:00* 



             Test Item    Value        Reference Range Interpretation Comments

 

             Red Blood Count (test code = 789-8) 4.50         4.3-5.7           

         





[facility] 
(moles/volume)2020 21:40:00* 



             Test Item    Value        Reference Range Interpretation Comments

 

             Hemoglobin (test code = 04780-2) 12.1         14.0-18.0            

      





[facility] Center[facility] hematocrit (volume 
fraction)2020 21:40:00* 



             Test Item    Value        Reference Range Interpretation Comments

 

             Hematocrit (test code = 4544-3) 37.6         38.2-49.6             

     





[facility] Center[facility] erythrocyte mean corpuscular 
[id number] 21:40:00* 



             Test Item    Value        Reference Range Interpretation Comments

 

             Mean Corpuscular Volume (test code = 787-2) 83.6         81-99     

                 





[facility] CenterAutomated erythrocyte mean corpuscular 
hemoglobin (mass per erythrocyte)2020 21:40:00* 



             Test Item    Value        Reference Range Interpretation Comments

 

             Mean Corpuscular Hemoglobin (test code = 785-6) 26.9         28-32 

                     





[facility] CenterAutomated erythrocyte mean corpuscular 
hemoglobin concentration measurement (mass/volume)2020 21:40:00* 



             Test Item    Value        Reference Range Interpretation Comments

 

             Mean Corpuscular Hemoglobin Concent (test code = 786-4) 32.2       

  31-35                      





[facility] CenterRDW [id number] 21:40:00* 



             Test Item    Value        Reference Range Interpretation Comments

 

             Red Cell Distribution Width (test code = 19092-6) 15.8         11.7

-14.4                  





[facility] CenterAutomated blood platelet count 
(count/volume)2020 21:40:00* 



             Test Item    Value        Reference Range Interpretation Comments

 

             Platelet Count (test code = 777-3) 179          140-360            

        





[facility] CenterAutomated blood segmented neutrophil 
count as percentage of total hoagfwqifi8444-88-93 21:40:00* 



             Test Item    Value        Reference Range Interpretation Comments

 

             Neutrophils (%) (Auto) (test code = 41348-5) 71.0         38.7-80.0

                  





[facility] CenterAutomated blood lymphocyte count as 
percentage ot total gspqbusshf7187-91-25 21:40:00* 



             Test Item    Value        Reference Range Interpretation Comments

 

             Lymphocytes (%) (Auto) (test code = 736-9) 16.9         18.0-39.1  

                





[facility] CenterAutomated blood monocyte count as 
percentage of total woksqgqesd4627-98-42 21:40:00* 



             Test Item    Value        Reference Range Interpretation Comments

 

             Monocytes (%) (Auto) (test code = 5905-5) 8.5          4.4-11.3    

               





[facility] CenterAutomated blood eosinophil count as 
percentage of total [id number] 21:40:00* 



             Test Item    Value        Reference Range Interpretation Comments

 

             Eosinophils (%) (Auto) (test code = 713-8) 2.4          0.0-6.0    

                





[facility] CenterAutomated blood basophil count as 
percentage of total cqhwdhwobt8136-55-91 21:40:00* 



             Test Item    Value        Reference Range Interpretation Comments

 

             Basophils (%) (Auto) (test code = 706-2) 0.8          0.0-1.0      

              





[facility] CenterFluoroscopic procedure less than one hour
 [id number] 21:40:00* 



             Test Item    Value        Reference Range Interpretation Comments

 

             IM GRANULOCYTES % (test code = IM GRANULOCYTES %) 0.4          0.0-

1.0                    





[facility] CenterAutomated blood neutrophil count
2020 21:40:00* 



             Test Item    Value        Reference Range Interpretation Comments

 

             Neutrophils # (Auto) (test code = 751-8) 5.6          2.1-6.9      

              





[facility] CenterBlood lymphocytes count (number/volume)
2020 21:40:00* 



             Test Item    Value        Reference Range Interpretation Comments

 

             Lymphocytes # (Auto) (test code = 37093-3) 1.3          1.0-3.2    

                





[facility] CenterBlood monocytes automated count 
(number/volume)2020 21:40:00* 



             Test Item    Value        Reference Range Interpretation Comments

 

             Monocytes # (Auto) (test code = 742-7) 0.7          0.2-0.8        

            





[facility] CenterAutomated blood eosinophil count
2020 21:40:00* 



             Test Item    Value        Reference Range Interpretation Comments

 

             Eosinophils # (Auto) (test code = 711-2) 0.2          0.0-0.4      

              





[facility] CenterAutomated blood basophil count 
(count/volume)2020 21:40:00* 



             Test Item    Value        Reference Range Interpretation Comments

 

             Basophils # (Auto) (test code = 704-7) 0.1          0.0-0.1        

            





[facility] CenterFluoroscopic procedure less than one hour
 [id number] 21:40:00* 



             Test Item    Value        Reference Range Interpretation Comments

 

                                        Absolute Immature Granulocyte (auto (lilly

t code = Absolute Immature Granulocyte 

(auto)          0.03            0-0.1                            





[facility] Centererum or plasma sodium measurement 
(moles/volume)2020 21:40:00* 



             Test Item    Value        Reference Range Interpretation Comments

 

             Sodium Level (test code = 2951-2) 136          136-145             

       





[facility] Centererum or plasma potassium measurement 
(moles/volume)2020 21:40:00* 



             Test Item    Value        Reference Range Interpretation Comments

 

             Potassium Level (test code = 2823-3) 4.2          3.5-5.1          

          





[facility] Centererum or plasma chloride measurement 
(moles/volume)2020 21:40:00* 



             Test Item    Value        Reference Range Interpretation Comments

 

             Chloride Level (test code = 2075-0) 102                      

         





[facility] Centererum or plasma carbon dioxide, total 
measurement (moles/volume)2020 21:40:00* 



             Test Item    Value        Reference Range Interpretation Comments

 

             Carbon Dioxide Level (test code = 2028-9) 20           22-29       

               





[facility] Centererum or plasma anion [id number] 
21:40:00* 



             Test Item    Value        Reference Range Interpretation Comments

 

             Anion Gap (test code = 33037-3) 18.2         8-16                  

     





[facility] Centererum or plasma urea nitrogen measurement
 (mass/volume)2020 21:40:00* 



             Test Item    Value        Reference Range Interpretation Comments

 

             Blood Urea Nitrogen (test code = 3094-0) 27           7-26         

              





[facility] Centererum or plasma creatinine measurement 
(mass/volume)2020 21:40:00* 



             Test Item    Value        Reference Range Interpretation Comments

 

             Creatinine (test code = 2160-0) 1.10         0.72-1.25             

     





[facility] Centererum or plasma urea nitrogen/creatinine 
mass [id number] 21:40:00* 



             Test Item    Value        Reference Range Interpretation Comments

 

             BUN/Creatinine Ratio (test code = 3097-3) 25           6-25        

               





[facility] CenterEstimated glomerular filtration rate 
(GFR) pcksczanmsgzn8580-80-29 21:40:00* 



             Test Item    Value        Reference Range Interpretation Comments

 

             Estimat Glomerular Filtration Rate (test code = 181077042) > 60    

     >60                        





Ranges were taken from the National Kidney Disease Education Program and the [employer]
Pending sale to [facility] [employer] literature.Reference ranges:60 or greater: Gisgec42-77 (
for 3 consecutive months): Chronic kidney disease 15 or less: Kidney failureSt. David's Medical CenterGlucose jsebdvomehe5535-37-40 21:40:00* 



             Test Item    Value        Reference Range Interpretation Comments

 

             Glucose Level (test code = LTE5437) 112                      

         





[facility] Centererum or plasma calcium measurement 
(mass/volume)2020 21:40:00* 



             Test Item    Value        Reference Range Interpretation Comments

 

             Calcium Level (test code = 29306-4) 9.8          8.4-10.2          

         





[facility] Centererum or plasma total bilirubin 
measurement (mass/volume)2020 21:40:00* 



             Test Item    Value        Reference Range Interpretation Comments

 

             Total Bilirubin (test code = 1975-2) 0.8          0.2-1.2          

          





[facility] CenterFluoroscopic procedure less than one hour
 [id number] 21:40:00* 



             Test Item    Value        Reference Range Interpretation Comments

 

                                        Aspartate Amino Transf (AST/SGOT) (test 

code = Aspartate Amino Transf 

(AST/SGOT))     60              5-34                             





[facility] Centererum or plasma alanine aminotransferase 
measurement (enzymatic activity/volume)2020 21:40:00* 



             Test Item    Value        Reference Range Interpretation Comments

 

             Alanine Aminotransferase (ALT/SGPT) (test code = 1742-6) 41        

   0-55                       





[facility] Centererum or plasma protein measurement 
(mass/volume)2020 21:40:00* 



             Test Item    Value        Reference Range Interpretation Comments

 

             Total Protein (test code = 2885-2) 6.8          6.5-8.1            

        





[facility] Centererum or plasma albumin measurement 
(mass/volume)2020 21:40:00* 



             Test Item    Value        Reference Range Interpretation Comments

 

             Albumin (test code = 1751-7) 4.1          3.5-5.0                  

  





[facility] CenterPlasma globulin measurement (mass/volume)
2020 21:40:00* 



             Test Item    Value        Reference Range Interpretation Comments

 

             Globulin (test code = 81109-6) 2.7          2.3-3.5                

    





[facility] Centererum or plasma albumin/globulin mass 
[id number] 21:40:00* 



             Test Item    Value        Reference Range Interpretation Comments

 

             Albumin/Globulin Ratio (test code = 1759-0) 1.5          0.8-2.0   

                 





[facility] Centererum or plasma alkaline phosphatase 
measurement (enzymatic activity/volume)2020 21:40:00* 



             Test Item    Value        Reference Range Interpretation Comments

 

             Alkaline Phosphatase (test code = 6768-6) 66                 

               





[facility] CenterFL, RAD TECH IN OR/30 MINUTE INCREMENTS
2018 15:42:00Reason for exam:->DYSPHAGIAFINAL REPORT PATIENT ID:   
[id number] Nondiagnostic exam. Radiology provided fluoroscopy for ERCP performed 
by Dr. Montalvo.  Neither radiologist presence nor interpretation were requested. 
Please refer to the operative report for further information. Fluoroscopy time 
was 2.7 minutes. Total # of images: 8 Signed: JR Lundberg Robert MDReport 
Verified Date/Time:  2018 15:42:00 Reading Location: [address] Consult 
Reading Room    Electronically signed by: BRANDI LUNDBERG on 2018 
03:42 PM [place], RAD TECH IN OR/30 MINUTE [id number] 17:53:00Reason for 
exam:->acahalaciaAddendum BeginsREPORT STATUS:A PATIENT ID:   [id number] Addendum:
 Fluoroscopy nonspecific dated 2018 Signed: Sanjiv Hwang 
Verified Date/Time:  2018 17:53:22 Reading Location: [address] 
Radiology Reading RoomAddendum EndsFINAL REPORT PATIENT ID:   [id number] 
Fluoroscopy nonspecific dated 2010 18 Comment: Total fluoroscopy time 
was 2.7 minutes. Total number of fluoroscopy images were 7.  The fluoroscopy 
study was provided to Dr. Montalvo for intraoperative procedure. No radiologist 
was present during the examination. Signed: Sanjiv Hwang Verified 
Date/Time:  2018 17:43:25 Reading Location: [address] Radiology 
Reading Room      Electronically signed by: SANJIV HWANG M.D. on 2018 
05:53 PM BLOOD [id number] 23:00:00* 



             Test Item    Value        Reference Range Interpretation Comments

 

             CULTURE (BEAKER) (test code = 1095) No growth in 5 days            

                





BLOOD [id number] 23:00:00* 



             Test Item    Value        Reference Range Interpretation Comments

 

             CULTURE (BEAKER) (test code = 1095) No growth in 5 days            

                





BLOOD [id number] 10:46:00* 



             Test Item    Value        Reference Range Interpretation Comments

 

             CULTURE (BEAKER) (test code = 1095)                           A    

        From Anaerobic Bottle Only Same 

organism has been isolated from cultures(s) of the same body site within 3 days.
 Repeat identification and susceptibility testing performed only after 
consultation with the clinical microbiology laboratory.Refer to previous culture
 ofStaphylococcus aureus

 

                          GRAM STAIN RESULT (BEAKER) (test code = 1123) From lorrie

erobic bottle only: gram 

positive cocci in clusters                                          





BLOOD [id number] 10:45:00* 



             Test Item    Value        Reference Range Interpretation Comments

 

             CULTURE (BEAKER) (test code = 1095)                           A    

        From Anaerobic Bottle Only Same 

organism has been isolated from cultures(s) of the same body site within 3 days.
 Repeat identification and susceptibility testing performed only after 
consultation with the clinical microbiology laboratory.Refer to previous culture
 ofStaphylococcus aureus

 

                          GRAM STAIN RESULT (BEAKER) (test code = 1123) From lorrie

erobic bottle only: gram 

positive cocci in clusters                                          





POCT-GLUCOSE [id number] 09:40:00* 



             Test Item    Value        Reference Range Interpretation Comments

 

             POC-GLUCOSE METER (BEAKER) (test code = 1538) 128 mg/dL      

     H            TESTED AT [facility]

 [address] [facility] [address]





POCT-GLUCOSE [id number] 22:07:00* 



             Test Item    Value        Reference Range Interpretation Comments

 

             POC-GLUCOSE METER (BEAKER) (test code = 1538) 102 mg/dL      

                  TESTED AT [place] [id number] [facility] [address]





BASIC METABOLIC [id number] 06:06:00* 



             Test Item    Value        Reference Range Interpretation Comments

 

             SODIUM (BEAKER) (test code = 381) 131 meq/L    136-145      L      

       

 

             POTASSIUM (BEAKER) (test code = 379) 3.7 meq/L    3.5-5.1          

          

 

             CHLORIDE (BEAKER) (test code = 382) 94 meq/L            L    

         

 

             CO2 (BEAKER) (test code = 355) 25 meq/L     22-29                  

    

 

             BLOOD UREA NITROGEN (BEAKER) (test code = 354) 11 mg/dL     7-21   

                    

 

             CREATININE (BEAKER) (test code = 358) 0.64 mg/dL   0.57-1.25       

           

 

             GLUCOSE RANDOM (BEAKER) (test code = 652) 127 mg/dL          

 H             

 

             CALCIUM (BEAKER) (test code = 697) 9.0 mg/dL    8.4-10.2           

        

 

             EGFR (BEAKER) (test code = 1092) 123 mL/min/1.73 sq m              

             ESTIMATED GFR IS NOT 

AS ACCURATE AS CREATININE CLEARANCE IN PREDICTING GLOMERULAR FILTRATION RATE. 
ESTIMATED GFR IS NOT APPLICABLE FOR DIALYSIS PATIENTS.





CBC W/PLT COUNT & AUTO [id number] 05:40:00* 



             Test Item    Value        Reference Range Interpretation Comments

 

             WHITE BLOOD CELL COUNT (BEAKER) (test code = 775) 11.0 K/ L    3.5-

10.5     H             

 

             RED BLOOD CELL COUNT (BEAKER) (test code = 761) 4.24 M/ L    4.63-6

.08    L             

 

             HEMOGLOBIN (BEAKER) (test code = 410) 12.6 GM/DL   13.7-17.5    L  

           

 

             HEMATOCRIT (BEAKER) (test code = 411) 36.3 %       40.1-51.0    L  

           

 

             MEAN CORPUSCULAR VOLUME (BEAKER) (test code = 753) 85.6 fL      79.

0-92.2                  

 

             MEAN CORPUSCULAR HEMOGLOBIN (BEAKER) (test code = 751) 29.7 pg     

 25.7-32.2                  

 

                    MEAN CORPUSCULAR HEMOGLOBIN CONC (BEAKER) (test code = 752) 

34.7 GM/DL          32.3-36.5

                                                     

 

             RED CELL DISTRIBUTION WIDTH (BEAKER) (test code = 412) 13.2 %      

 11.6-14.4                  

 

             PLATELET COUNT (BEAKER) (test code = 756) 232 K/CU MM  150-450     

               

 

             MEAN PLATELET VOLUME (BEAKER) (test code = 754) 10.3 fL      9.4-12

.4                   

 

             NUCLEATED RED BLOOD CELLS (BEAKER) (test code = 413) 0 /100 WBC   0

-0                        

 

             NEUTROPHILS RELATIVE PERCENT (BEAKER) (test code = 429) 68 %       

                             

 

             LYMPHOCYTES RELATIVE PERCENT (BEAKER) (test code = 430) 19 %       

                             

 

             MONOCYTES RELATIVE PERCENT (BEAKER) (test code = 431) 9 %          

                           

 

             EOSINOPHILS RELATIVE PERCENT (BEAKER) (test code = 432) 2 %        

                             

 

             BASOPHILS RELATIVE PERCENT (BEAKER) (test code = 437) 0 %          

                           

 

             NEUTROPHILS ABSOLUTE COUNT (BEAKER) (test code = 670) 7.46 K/ L    

1.78-5.38    H             

 

             LYMPHOCYTES ABSOLUTE COUNT (BEAKER) (test code = 414) 2.05 K/ L    

1.32-3.57                  

 

             MONOCYTES ABSOLUTE COUNT (BEAKER) (test code = 415) 1.00 K/ L    0.

30-0.82    H             

 

             EOSINOPHILS ABSOLUTE COUNT (BEAKER) (test code = 416) 0.19 K/ L    

0.04-0.54                  

 

             BASOPHILS ABSOLUTE COUNT (BEAKER) (test code = 417) 0.03 K/ L    0.

01-0.08                  

 

             IMMATURE GRANULOCYTES-RELATIVE PERCENT (BEAKER) (test code = 2801) 

2 %          0-1          H             





POCT-GLUCOSE [id number] 21:41:00* 



             Test Item    Value        Reference Range Interpretation Comments

 

             POC-GLUCOSE METER (BEAKER) (test code = 1538) 199 mg/dL      

     H            TESTED AT [place] [id number] [facility] [address]





EOSINOPHIL SMEAR, [id number] 20:56:00* 



             Test Item    Value        Reference Range Interpretation Comments

 

             EOSINOPHIL SMEAR, URINE (BEAKER) (test code = 1851) No EOS seen  No

 EOS seen                





POCT-GLUCOSE [id number] 16:37:00* 



             Test Item    Value        Reference Range Interpretation Comments

 

             POC-GLUCOSE METER (BEAKER) (test code = 1538) 141 mg/dL      

     H            TESTED AT [facility]

 [address] [facility] [address]





RAD, ANKLE, 2 VIEWS, [id number] 15:22:00Reason for exam:->pain assess for 
effusionFINAL REPORT PATIENT ID:   [id number] Ankle, left, two views INDICATION: 
Pain. Effusion. COMPARISON: None available IMPRESSION: There is no evidence of 
acute fracture, dislocation, or destructive osseous lesion. Mild ankle joint 
arthritic changes are suspected with tiny osteophytes. There are incidental 
vascular calcifications. No significant ankle joint effusion is seen 
radiographically. Signed: Mirna Longoria Verified Date/Time:  
2018 15:22:52 Reading Location: [address] Consult Reading Room   
Electronically signed by: MIRNA LONGORIA M.D. on 2018 03:22 PM POCT-
GLUCOSE [id number] 09:24:00* 



             Test Item    Value        Reference Range Interpretation Comments

 

             POC-GLUCOSE METER (BEAKER) (test code = 1538) 119 mg/dL      

     H            TESTED AT [facility]

 [address] [facility] [address]





BASIC METABOLIC [id number] 06:29:00* 



             Test Item    Value        Reference Range Interpretation Comments

 

             SODIUM (BEAKER) (test code = 381) 130 meq/L    136-145      L      

       

 

             POTASSIUM (BEAKER) (test code = 379) 3.6 meq/L    3.5-5.1          

          

 

             CHLORIDE (BEAKER) (test code = 382) 89 meq/L            L    

         

 

             CO2 (BEAKER) (test code = 355) 27 meq/L     22-29                  

    

 

             BLOOD UREA NITROGEN (BEAKER) (test code = 354) 12 mg/dL     7-21   

                    

 

             CREATININE (BEAKER) (test code = 358) 1.63 mg/dL   0.57-1.25    H  

           

 

             GLUCOSE RANDOM (BEAKER) (test code = 652) 323 mg/dL          

 H             

 

             CALCIUM (BEAKER) (test code = 697) 8.6 mg/dL    8.4-10.2           

        

 

             EGFR (BEAKER) (test code = 1092) 42 mL/min/1.73 sq m               

            ESTIMATED GFR IS NOT AS

 ACCURATE AS CREATININE CLEARANCE IN PREDICTING GLOMERULAR FILTRATION RATE. 
ESTIMATED GFR IS NOT APPLICABLE FOR DIALYSIS PATIENTS.





CBC W/PLT COUNT & AUTO [id number] 05:52:00* 



             Test Item    Value        Reference Range Interpretation Comments

 

             WHITE BLOOD CELL COUNT (BEAKER) (test code = 775) 9.2 K/ L     3.5-

10.5                   

 

             RED BLOOD CELL COUNT (BEAKER) (test code = 761) 3.88 M/ L    4.63-6

.08    L             

 

             HEMOGLOBIN (BEAKER) (test code = 410) 11.5 GM/DL   13.7-17.5    L  

           

 

             HEMATOCRIT (BEAKER) (test code = 411) 34.0 %       40.1-51.0    L  

           

 

             MEAN CORPUSCULAR VOLUME (BEAKER) (test code = 753) 87.6 fL      79.

0-92.2                  

 

             MEAN CORPUSCULAR HEMOGLOBIN (BEAKER) (test code = 751) 29.6 pg     

 25.7-32.2                  

 

                    MEAN CORPUSCULAR HEMOGLOBIN CONC (BEAKER) (test code = 752) 

33.8 GM/DL          32.3-36.5

                                                     

 

             RED CELL DISTRIBUTION WIDTH (BEAKER) (test code = 412) 13.3 %      

 11.6-14.4                  

 

             PLATELET COUNT (BEAKER) (test code = 756) 178 K/CU MM  150-450     

               

 

             MEAN PLATELET VOLUME (BEAKER) (test code = 754) 10.2 fL      9.4-12

.4                   

 

             NUCLEATED RED BLOOD CELLS (BEAKER) (test code = 413) 0 /100 WBC   0

-0                        

 

             NEUTROPHILS RELATIVE PERCENT (BEAKER) (test code = 429) 65 %       

                             

 

             LYMPHOCYTES RELATIVE PERCENT (BEAKER) (test code = 430) 22 %       

                             

 

             MONOCYTES RELATIVE PERCENT (BEAKER) (test code = 431) 9 %          

                           

 

             EOSINOPHILS RELATIVE PERCENT (BEAKER) (test code = 432) 2 %        

                             

 

             BASOPHILS RELATIVE PERCENT (BEAKER) (test code = 437) 0 %          

                           

 

             NEUTROPHILS ABSOLUTE COUNT (BEAKER) (test code = 670) 5.98 K/ L    

1.78-5.38    H             

 

             LYMPHOCYTES ABSOLUTE COUNT (BEAKER) (test code = 414) 2.01 K/ L    

1.32-3.57                  

 

             MONOCYTES ABSOLUTE COUNT (BEAKER) (test code = 415) 0.86 K/ L    0.

30-0.82    H             

 

             EOSINOPHILS ABSOLUTE COUNT (BEAKER) (test code = 416) 0.14 K/ L    

0.04-0.54                  

 

             BASOPHILS ABSOLUTE COUNT (BEAKER) (test code = 417) 0.02 K/ L    0.

01-0.08                  

 

             IMMATURE GRANULOCYTES-RELATIVE PERCENT (BEAKER) (test code = 2801) 

2 %          0-1          H             





POCT-GLUCOSE [id number] 22:25:00* 



             Test Item    Value        Reference Range Interpretation Comments

 

             POC-GLUCOSE METER (BEAKER) (test code = 1538) 136 mg/dL      

     H            TESTED AT [address] [address]





POCT-GLUCOSE [id number] 19:26:00* 



             Test Item    Value        Reference Range Interpretation Comments

 

             POC-GLUCOSE METER (BEAKER) (test code = 1538) 114 mg/dL      

     H            TESTED AT [address] [address]





POCT-GLUCOSE [id number] 15:02:00* 



             Test Item    Value        Reference Range Interpretation Comments

 

             POC-GLUCOSE METER (BEAKER) (test code = 1538) 141 mg/dL      

     H            TESTED AT [facility]

 [address] [facility] [address]





BLOOD [id number] 09:31:00* 



             Test Item    Value        Reference Range Interpretation Comments

 

             CULTURE (BEAKER) (test code = 1095)                           A    

        From Aerobic And Anaerobic Bottles 

Same organism has been isolated from cultures(s) of the same body site and 
collection date. Repeat identification and susceptibility testing performed only
 after consultation with the clinical microbiology laboratory.Refer to previous 
culture ofStaphylococcus aureus

 

                          GRAM STAIN RESULT (BEAKER) (test code = 1123) From aer

obic bottle only: gram 

positive cocci in clusters                                          

 

                          GRAM STAIN RESULT (BEAKER) (test code = 227189) From a

naerobic bottle only: gram

 positive cocci in clusters                                          





BLOOD [id number] 09:29:00* 



             Test Item    Value        Reference Range Interpretation Comments

 

             CULTURE (BEAKER) (test code = 1095)                                

         

 

             Clindamycin (test code = 10)                           S           

  

 

             Erythromycin (test code = 4)                           S           

  

 

             Linezolid (test code = 40)                           S             

 

             Oxacillin (test code = 14)                           S             

 

             Rifampin (test code = 43)                           S             

 

             Tetracycline (test code = 2)                           S           

  

 

             Trimethoprim + Sulfamethoxazole (test code = 47)                   

        S             

 

             Vancomycin (test code = 13)                           S            

 

 

             CULTURE (BEAKER) (test code = 1095)                           A    

        From Aerobic And Anaerobic Bottles 

Staphylococcus aureus

 

                          GRAM STAIN RESULT (BEAKER) (test code = 1123) From aer

obic bottle only: gram 

positive cocci in clusters                                          





METHICILLIN-SUSCEPTIBLE STAPH. AUREUS (MSSA) DETECTEDStaphylococcus aureus DETEC
KELLE MecA NOT DETECTEDFirst line therapy: cefazolin or nafcillin (nafcillin prefe
rred if Central Nervous System  Infection)[facility] policy 
mandates Infectious Disease consultation for all patients with Staph. aureus bac
teremia.Other organisms and resistance markers not contained in this PCR panel c
annot be excluded and follow-up of traditional culture results is required.  Thi
s sample was tested at the [facility] Clinical Microbiology Laboratory using the Biof
joyce FilmArray Blood Culture ID Panel. This test is FDA cleared for in vitro diag
nostic use and has been verified and approved by the [facility] Clinical Microbiology
 laboratory for clinical use. Reference Range: Not DetectedPOCT-GLUCOSE METER
2018 08:31:00* 



             Test Item    Value        Reference Range Interpretation Comments

 

             POC-GLUCOSE METER (BEAKER) (test code = 1538) 100 mg/dL      

                  TESTED AT [facility]

 [address] [facility] [address]





[id number] 07:59:00* 



             Test Item    Value        Reference Range Interpretation Comments

 

             PHOSPHORUS (BEAKER) (test code = 604) 2.2 mg/dL    2.3-4.7      L  

           





[id number] 07:59:00* 



             Test Item    Value        Reference Range Interpretation Comments

 

             MAGNESIUM (BEAKER) (test code = 627) 1.2 mg/dL    1.6-2.6      L   

          





BASIC METABOLIC [id number] 07:59:00* 



             Test Item    Value        Reference Range Interpretation Comments

 

             SODIUM (BEAKER) (test code = 381) 138 meq/L    136-145             

       

 

             POTASSIUM (BEAKER) (test code = 379) 3.4 meq/L    3.5-5.1      L   

          

 

             CHLORIDE (BEAKER) (test code = 382) 98 meq/L                 

         

 

             CO2 (BEAKER) (test code = 355) 30 meq/L     22-29        H         

    

 

             BLOOD UREA NITROGEN (BEAKER) (test code = 354) 13 mg/dL     7-21   

                    

 

             CREATININE (BEAKER) (test code = 358) 0.59 mg/dL   0.57-1.25       

           

 

             GLUCOSE RANDOM (BEAKER) (test code = 652) 78 mg/dL           

               

 

             CALCIUM (BEAKER) (test code = 697) 9.3 mg/dL    8.4-10.2           

        

 

             EGFR (BEAKER) (test code = 1092) 135 mL/min/1.73 sq m              

             ESTIMATED GFR IS NOT 

AS ACCURATE AS CREATININE CLEARANCE IN PREDICTING GLOMERULAR FILTRATION RATE. 
ESTIMATED GFR IS NOT APPLICABLE FOR DIALYSIS PATIENTS.





HEPATIC FUNCTION [id number] 07:59:00* 



             Test Item    Value        Reference Range Interpretation Comments

 

             TOTAL PROTEIN (BEAKER) (test code = 770) 6.6 gm/dL    6.0-8.3      

              

 

             ALBUMIN (BEAKER) (test code = 1145) 2.9 g/dL     3.5-5.0      L    

         

 

             BILIRUBIN TOTAL (BEAKER) (test code = 377) 0.9 mg/dL    0.2-1.2    

                

 

             BILIRUBIN DIRECT (BEAKER) (test code = 706) 0.5 mg/dL    0.1-0.5   

                 

 

             ALKALINE PHOSPHATASE (BEAKER) (test code = 346) 162 U/L      

       H             

 

             AST (SGOT) (BEAKER) (test code = 353) 70 U/L       5-34         H  

           

 

             ALT (SGPT) (BEAKER) (test code = 347) 61 U/L       6-55         H  

           





PROTHROMBIN TIME/[id number] 07:43:00* 



             Test Item    Value        Reference Range Interpretation Comments

 

             PROTIME (BEAKER) (test code = 759) 15.1 seconds 11.7-14.7    H     

        

 

             INR (BEAKER) (test code = 370) 1.2          <=5.9                  

    





RECOMMENDED COUMADIN/WARFARIN INR THERAPY RANGESSTANDARD DOSE: 2.0 - 3.0   Inclu
marilyn: PROPHYLAXIS for venous thrombosis, systemic embolization; TREATMENT for helio
ous thrombosis and/or pulmonary embolus.HIGH RISK: Target INR is 2.5-3.5 for pat
ients with mechanical heart valves.POCT-GLUCOSE METER2018-01-15 21:31:00* 



             Test Item    Value        Reference Range Interpretation Comments

 

             POC-GLUCOSE METER (BEAKER) (test code = 1538) 168 mg/dL      

     H            TESTED AT [address] [address]





RAD, CHEST, 1 VIEW, NON DEPT2018-01-15 17:18:00Reason for exam:->check picc 
placement Should this be performed at the bedside?->YesFINAL REPORT PATIENT ID: 
  [id number]  AP view of the chest dated 1/15/2018 CLINICAL INFORMATION: check 
picc placement Comment:  Heart is normal in size. Pulmonary vasculature is 
unremarkable. Subsegmental atelectasis is seen both lung bases. The rest of 
lungs are clear. No pulmonary infiltrate or pleural effusion is present. A left 
PICC line is present with tip seen in the superior vena cava. Signed: Sanjiv Hwang MDReport Verified Date/Time:  01/15/2018 17:18:19 Reading Location: [address] Consult Reading Room      Electronically signed by: SANJIV HWANG M.D. on
 01/15/2018 05:18 PM POCT-GLUCOSE METER2018-01-15 17:15:00* 



             Test Item    Value        Reference Range Interpretation Comments

 

             POC-GLUCOSE METER (BEAKER) (test code = 1538) 204 mg/dL      

     H            TESTED AT [facility]

 [address] [facility] [address]





MISCELLANEOUS LAB ORDER2018-01-15 14:38:00* 



             Test Item    Value        Reference Range Interpretation Comments

 

             SCAN RESULT (test code = 4563155)                                  

       





Result comments: METHICILLIN-SUSCEPTIBLE STAPH. AUREUS (MSSA) DETECTED Staphyloc
occus aureus DETECTED MecA NOT DETECTED First line therapy: cefazolin or nafcill
in (nafcillin preferred if Central Nervous System  Infection) [facility] policy mandates Infectious Disease consultation for all patients wi
th Staph. aureus bacteremia. Other organisms and resistance markers not containe
d in this PCR panel cannot be excluded and follow-up of traditional culture resu
lts is required.   This sample was tested at the [facility]
oratory using the [employer] Blood Culture ID Panel. This test is FDA victor manuel
ared for in vitro diagnostic use and has been verified and approved by the [facility]
 Clinical Microbiology laboratory for clinical use. Reference Range: Not Detecte
d BODY FLUID CULTURE + GRAM STAIN2018-01-15 12:28:00* 



             Test Item    Value        Reference Range Interpretation Comments

 

             CULTURE (BEAKER) (test code = 1095)                                

         

 

             Clindamycin (test code = 10)                           S           

  

 

             Erythromycin (test code = 4)                           S           

  

 

             Linezolid (test code = 40)                           S             

 

             Nitrofurantoin (test code = 23)                           S        

     

 

             Oxacillin (test code = 14)                           S             

 

             Rifampin (test code = 43)                           S             

 

             Tetracycline (test code = 2)                           S           

  

 

             Trimethoprim + Sulfamethoxazole (test code = 47)                   

        S             

 

             Vancomycin (test code = 13)                           S            

 

 

             CULTURE (BEAKER) (test code = 1095)                           A    

        Staphylococcus aureus

 

                          GRAM STAIN RESULT (BEAKER) (test code = 1123) From aer

obic bottle only: gram 

positive cocci in clusters                                          





POCT-GLUCOSE METER2018-01-15 11:57:00* 



             Test Item    Value        Reference Range Interpretation Comments

 

             POC-GLUCOSE METER (BEAKER) (test code = 1538) 176 mg/dL      

     H            TESTED AT [facility]

 [address] [facility] [address]





POCT-GLUCOSE METER2018-01-15 08:08:00* 



             Test Item    Value        Reference Range Interpretation Comments

 

             POC-GLUCOSE METER (BEAKER) (test code = 1538) 147 mg/dL      

     H            TESTED AT [facility]

 [address] [facility] [address]





PHOSPHORUS2018-01-15 06:12:00* 



             Test Item    Value        Reference Range Interpretation Comments

 

             PHOSPHORUS (BEAKER) (test code = 604) 2.3 mg/dL    2.3-4.7         

           





MAGNESIUM2018-01-15 06:12:00* 



             Test Item    Value        Reference Range Interpretation Comments

 

             MAGNESIUM (BEAKER) (test code = 627) 1.2 mg/dL    1.6-2.6      L   

          





BASIC METABOLIC PANEL2018-01-15 06:12:00* 



             Test Item    Value        Reference Range Interpretation Comments

 

             SODIUM (BEAKER) (test code = 381) 139 meq/L    136-145             

       

 

             POTASSIUM (BEAKER) (test code = 379) 3.0 meq/L    3.5-5.1      L   

          

 

             CHLORIDE (BEAKER) (test code = 382) 96 meq/L            L    

         

 

             CO2 (BEAKER) (test code = 355) 34 meq/L     22-29        H         

    

 

             BLOOD UREA NITROGEN (BEAKER) (test code = 354) 14 mg/dL     7-21   

                    

 

             CREATININE (BEAKER) (test code = 358) 0.63 mg/dL   0.57-1.25       

           

 

             GLUCOSE RANDOM (BEAKER) (test code = 652) 115 mg/dL          

 H             

 

             CALCIUM (BEAKER) (test code = 697) 9.2 mg/dL    8.4-10.2           

        

 

             EGFR (BEAKER) (test code = 1092) 126 mL/min/1.73 sq m              

             ESTIMATED GFR IS NOT 

AS ACCURATE AS CREATININE CLEARANCE IN PREDICTING GLOMERULAR FILTRATION RATE. 
ESTIMATED GFR IS NOT APPLICABLE FOR DIALYSIS PATIENTS.





HEPATIC FUNCTION PANEL2018-01-15 06:12:00* 



             Test Item    Value        Reference Range Interpretation Comments

 

             TOTAL PROTEIN (BEAKER) (test code = 770) 6.4 gm/dL    6.0-8.3      

              

 

             ALBUMIN (BEAKER) (test code = 1145) 2.9 g/dL     3.5-5.0      L    

         

 

             BILIRUBIN TOTAL (BEAKER) (test code = 377) 1.0 mg/dL    0.2-1.2    

                

 

             BILIRUBIN DIRECT (BEAKER) (test code = 706) 0.6 mg/dL    0.1-0.5   

   H             

 

             ALKALINE PHOSPHATASE (BEAKER) (test code = 346) 104 U/L      

                     

 

             AST (SGOT) (BEAKER) (test code = 353) 24 U/L       5-34            

           

 

             ALT (SGPT) (BEAKER) (test code = 347) 28 U/L       6-55            

           





CBC W/PLT COUNT & AUTO DIFFERENTIAL2018-01-15 06:02:00* 



             Test Item    Value        Reference Range Interpretation Comments

 

             WHITE BLOOD CELL COUNT (BEAKER) (test code = 775) 5.4 K/ L     3.5-

10.5                   

 

             RED BLOOD CELL COUNT (BEAKER) (test code = 761) 3.79 M/ L    4.63-6

.08    L             

 

             HEMOGLOBIN (BEAKER) (test code = 410) 11.3 GM/DL   13.7-17.5    L  

           

 

             HEMATOCRIT (BEAKER) (test code = 411) 34.0 %       40.1-51.0    L  

           

 

             MEAN CORPUSCULAR VOLUME (BEAKER) (test code = 753) 89.7 fL      79.

0-92.2                  

 

             MEAN CORPUSCULAR HEMOGLOBIN (BEAKER) (test code = 751) 29.8 pg     

 25.7-32.2                  

 

                    MEAN CORPUSCULAR HEMOGLOBIN CONC (BEAKER) (test code = 752) 

33.2 GM/DL          32.3-36.5

                                                     

 

             RED CELL DISTRIBUTION WIDTH (BEAKER) (test code = 412) 13.4 %      

 11.6-14.4                  

 

             PLATELET COUNT (BEAKER) (test code = 756) 156 K/CU MM  150-450     

               

 

             MEAN PLATELET VOLUME (BEAKER) (test code = 754) 10.2 fL      9.4-12

.4                   

 

             NUCLEATED RED BLOOD CELLS (BEAKER) (test code = 413) 0 /100 WBC   0

-0                        

 

             NEUTROPHILS RELATIVE PERCENT (BEAKER) (test code = 429) 61 %       

                             

 

             LYMPHOCYTES RELATIVE PERCENT (BEAKER) (test code = 430) 28 %       

                             

 

             MONOCYTES RELATIVE PERCENT (BEAKER) (test code = 431) 9 %          

                           

 

             EOSINOPHILS RELATIVE PERCENT (BEAKER) (test code = 432) 1 %        

                             

 

             BASOPHILS RELATIVE PERCENT (BEAKER) (test code = 437) 0 %          

                           

 

             NEUTROPHILS ABSOLUTE COUNT (BEAKER) (test code = 670) 3.27 K/ L    

1.78-5.38                  

 

             LYMPHOCYTES ABSOLUTE COUNT (BEAKER) (test code = 414) 1.53 K/ L    

1.32-3.57                  

 

             MONOCYTES ABSOLUTE COUNT (BEAKER) (test code = 415) 0.51 K/ L    0.

30-0.82                  

 

             EOSINOPHILS ABSOLUTE COUNT (BEAKER) (test code = 416) 0.03 K/ L    

0.04-0.54    L             

 

             BASOPHILS ABSOLUTE COUNT (BEAKER) (test code = 417) 0.00 K/ L    0.

01-0.08    L             

 

             IMMATURE GRANULOCYTES-RELATIVE PERCENT (BEAKER) (test code = 2801) 

1 %          0-1                        





PROTHROMBIN TIME/INR2018-01-15 05:55:00* 



             Test Item    Value        Reference Range Interpretation Comments

 

             PROTIME (BEAKER) (test code = 759) 17.6 seconds 11.7-14.7    H     

        

 

             INR (BEAKER) (test code = 370) 1.5          <=5.9                  

    





RECOMMENDED COUMADIN/WARFARIN INR THERAPY RANGESSTANDARD DOSE: 2.0 - 3.0   Inclu
marilyn: PROPHYLAXIS for venous thrombosis, systemic embolization; TREATMENT for helio
ous thrombosis and/or pulmonary embolus.HIGH RISK: Target INR is 2.5-3.5 for pat
ients with mechanical heart valves.POCT-GLUCOSE [id number] 21:24:00* 



             Test Item    Value        Reference Range Interpretation Comments

 

             POC-GLUCOSE METER (BEAKER) (test code = 1538) 180 mg/dL      

     H            TESTED AT [facility]

 [address] [place] [address]





POCT-GLUCOSE [id number] 17:27:00* 



             Test Item    Value        Reference Range Interpretation Comments

 

             POC-GLUCOSE METER (BEAKER) (test code = 1538) 147 mg/dL      

     H            TESTED AT [facility]

 [address] [facility] [address]





URINE [id number] 13:01:00* 



             Test Item    Value        Reference Range Interpretation Comments

 

             CULTURE (BEAKER) (test code = 1095) No growth                      

         





BODY FLUID [id number] 12:52:00* 



             Test Item    Value        Reference Range Interpretation Comments

 

             CRYSTALS, BODY FLUID (BEAKER) (test code = 2165) No crystals seen. 

                           

 

                          [facility]-PATHOLOGIST-940 (BEAKER) (test code = 2607) Mered ith Reyes, MD (electronic 

signature)                                                   





POCT-GLUCOSE [id number] 11:27:00* 



             Test Item    Value        Reference Range Interpretation Comments

 

             POC-GLUCOSE METER (BEAKER) (test code = 1538) 122 mg/dL      

     H            TESTED AT [address] [address]





BASIC METABOLIC [id number] 06:13:00* 



             Test Item    Value        Reference Range Interpretation Comments

 

             SODIUM (BEAKER) (test code = 381) 139 meq/L    136-145             

       

 

             POTASSIUM (BEAKER) (test code = 379) 2.6 meq/L    3.5-5.1      LL  

          

 

             CHLORIDE (BEAKER) (test code = 382) 98 meq/L                 

         

 

             CO2 (BEAKER) (test code = 355) 30 meq/L     22-29        H         

    

 

             BLOOD UREA NITROGEN (BEAKER) (test code = 354) 21 mg/dL     7-21   

                    

 

             CREATININE (BEAKER) (test code = 358) 0.63 mg/dL   0.57-1.25       

           

 

             GLUCOSE RANDOM (BEAKER) (test code = 652) 105 mg/dL          

               

 

             CALCIUM (BEAKER) (test code = 697) 9.4 mg/dL    8.4-10.2           

        

 

             EGFR (BEAKER) (test code = 1092) 126 mL/min/1.73 sq m              

             ESTIMATED GFR IS NOT 

AS ACCURATE AS CREATININE CLEARANCE IN PREDICTING GLOMERULAR FILTRATION RATE. 
ESTIMATED GFR IS NOT APPLICABLE FOR DIALYSIS PATIENTS.





[id number] 06:04:00* 



             Test Item    Value        Reference Range Interpretation Comments

 

             PHOSPHORUS (BEAKER) (test code = 604) 1.7 mg/dL    2.3-4.7      L  

           





[id number] 06:04:00* 



             Test Item    Value        Reference Range Interpretation Comments

 

             MAGNESIUM (BEAKER) (test code = 627) 1.4 mg/dL    1.6-2.6      L   

          





HEPATIC FUNCTION [id number] 06:04:00* 



             Test Item    Value        Reference Range Interpretation Comments

 

             TOTAL PROTEIN (BEAKER) (test code = 770) 6.8 gm/dL    6.0-8.3      

              

 

             ALBUMIN (BEAKER) (test code = 1145) 3.1 g/dL     3.5-5.0      L    

         

 

             BILIRUBIN TOTAL (BEAKER) (test code = 377) 1.0 mg/dL    0.2-1.2    

                

 

             BILIRUBIN DIRECT (BEAKER) (test code = 706) 0.5 mg/dL    0.1-0.5   

                 

 

             ALKALINE PHOSPHATASE (BEAKER) (test code = 346) 106 U/L      

                     

 

             AST (SGOT) (BEAKER) (test code = 353) 34 U/L       5-34            

           

 

             ALT (SGPT) (BEAKER) (test code = 347) 29 U/L       6-55            

           





PROTHROMBIN TIME/[id number] 05:44:00* 



             Test Item    Value        Reference Range Interpretation Comments

 

             PROTIME (BEAKER) (test code = 759) 14.9 seconds 11.7-14.7    H     

        

 

             INR (BEAKER) (test code = 370) 1.2          <=5.9                  

    





RECOMMENDED COUMADIN/WARFARIN INR THERAPY RANGESSTANDARD DOSE: 2.0 - 3.0   Inclu
marilyn: PROPHYLAXIS for venous thrombosis, systemic embolization; TREATMENT for helio
ous thrombosis and/or pulmonary embolus.HIGH RISK: Target INR is 2.5-3.5 for pat
ients with mechanical heart valves.POCT-GLUCOSE [id number] 20:55:00* 



             Test Item    Value        Reference Range Interpretation Comments

 

             POC-GLUCOSE METER (BEAKER) (test code = 1538) 168 mg/dL      

     H            TESTED AT [address] [address]





POCT-GLUCOSE [id number] 18:20:00* 



             Test Item    Value        Reference Range Interpretation Comments

 

             POC-GLUCOSE METER (BEAKER) (test code = 1538) 153 mg/dL      

     H            TESTED AT [address] [address]





URINALYSIS W/ [id number] 14:33:00* 



             Test Item    Value        Reference Range Interpretation Comments

 

             COLOR (BEAKER) (test code = 470) Yellow                            

      

 

             CLARITY (BEAKER) (test code = 469) Clear                           

        

 

             SPECIFIC GRAVITY UA (BEAKER) (test code = 468) 1.011        1.001-1

.035                

 

             PH UA (BEAKER) (test code = 467) 6.5          5.0-8.0              

      

 

             PROTEIN UA (BEAKER) (test code = 464) Negative     Negative        

           

 

             GLUCOSE UA (BEAKER) (test code = 365) Negative     Negative        

           

 

             KETONES UA (BEAKER) (test code = 371) Negative     Negative        

           

 

             BILIRUBIN UA (BEAKER) (test code = 462) Negative     Negative      

             

 

             BLOOD UA (BEAKER) (test code = 461) Negative     Negative          

         

 

             NITRITE UA (BEAKER) (test code = 465) Negative     Negative        

           

 

             LEUKOCYTE ESTERASE UA (BEAKER) (test code = 466) Negative     Negat

christina                   

 

             UROBILINOGEN UA (BEAKER) (test code = 463) 3.0 mg/dL    0.2-1.0    

  H             

 

             RBC UA (BEAKER) (test code = 519) 0 /HPF                           

       

 

             WBC UA (BEAKER) (test code = 520) < /HPF                           

       

 

             SOURCE(BEAKER) (test code = 9305) Urine, Clean Catch               

             





SEDIMENTATION [id number] 14:06:00* 



             Test Item    Value        Reference Range Interpretation Comments

 

             SEDIMENTATION RATE, ERYTHROCYTE (BEAKER) (test code = 766) 114 mm/H

R    0-40         H             





POCT-GLUCOSE [id number] 13:44:00* 



             Test Item    Value        Reference Range Interpretation Comments

 

             POC-GLUCOSE METER (BEAKER) (test code = 1538) 122 mg/dL      

     H            TESTED AT [facility]

 [address] [facility] [address]





BODY FLUID CELL COUNT WITH [id number] 13:34:00* 



             Test Item    Value        Reference Range Interpretation Comments

 

             APPEARANCE FLUID (BEAKER) (test code = 510) Moderately Bloody Clear

        A             

 

             COLOR FLUID (BEAKER) (test code = 511) Orange       Colorless, Stra

w A             

 

             RBC FLUID (BEAKER) (test code = 513) 88771 /cu mm <=1          H   

          

 

             ADJUSTED WBC FLUID (BEAKER) (test code = 1691) 91731 /cu mm <=5    

      H             

 

             LINING CELLS (BEAKER) (test code = 1590) 0 /cu mm     <=1          

              

 

             NEUTROPHILS FLUID (BEAKER) (test code = 1656) 86 %                 

                   

 

             LYMPHS FLUID (BEAKER) (test code = 488) 4 %                        

             

 

             MONO/MACROPHAGE FLUID (BEAKER) (test code = 489) 10 %              

                      

 

             EOSINOPHILS FLUID (BEAKER) (test code = 491) 0 %                   

                  

 

             BASO FLUID (BEAKER) (test code = 492) 0 %                          

           

 

             CONTAINER BODY FLUID (BEAKER) (test code = 2873) EDTA Tube         

                      





CBC W/PLT COUNT & AUTO [id number] 11:29:00* 



             Test Item    Value        Reference Range Interpretation Comments

 

             WHITE BLOOD CELL COUNT (BEAKER) (test code = 775) 9.9 K/ L     3.5-

10.5                   

 

             RED BLOOD CELL COUNT (BEAKER) (test code = 761) 3.82 M/ L    4.63-6

.08    L             

 

             HEMOGLOBIN (BEAKER) (test code = 410) 11.4 GM/DL   13.7-17.5    L  

           

 

             HEMATOCRIT (BEAKER) (test code = 411) 32.9 %       40.1-51.0    L  

           

 

             MEAN CORPUSCULAR VOLUME (BEAKER) (test code = 753) 86.1 fL      79.

0-92.2                  

 

             MEAN CORPUSCULAR HEMOGLOBIN (BEAKER) (test code = 751) 29.8 pg     

 25.7-32.2                  

 

                    MEAN CORPUSCULAR HEMOGLOBIN CONC (BEAKER) (test code = 752) 

34.7 GM/DL          32.3-36.5

                                                     

 

             RED CELL DISTRIBUTION WIDTH (BEAKER) (test code = 412) 13.7 %      

 11.6-14.4                  

 

             PLATELET COUNT (BEAKER) (test code = 756) 193 K/CU MM  150-450     

               

 

             MEAN PLATELET VOLUME (BEAKER) (test code = 754) 10.3 fL      9.4-12

.4                   

 

             NUCLEATED RED BLOOD CELLS (BEAKER) (test code = 413) 0 /100 WBC   0

-0                        

 

             NEUTROPHILS RELATIVE PERCENT (BEAKER) (test code = 429) 76 %       

                             

 

             LYMPHOCYTES RELATIVE PERCENT (BEAKER) (test code = 430) 15 %       

                             

 

             MONOCYTES RELATIVE PERCENT (BEAKER) (test code = 431) 8 %          

                           

 

             EOSINOPHILS RELATIVE PERCENT (BEAKER) (test code = 432) 0 %        

                             

 

             BASOPHILS RELATIVE PERCENT (BEAKER) (test code = 437) 0 %          

                           

 

             NEUTROPHILS ABSOLUTE COUNT (BEAKER) (test code = 670) 7.58 K/ L    

1.78-5.38    H             

 

             LYMPHOCYTES ABSOLUTE COUNT (BEAKER) (test code = 414) 1.45 K/ L    

1.32-3.57                  

 

             MONOCYTES ABSOLUTE COUNT (BEAKER) (test code = 415) 0.78 K/ L    0.

30-0.82                  

 

             EOSINOPHILS ABSOLUTE COUNT (BEAKER) (test code = 416) 0.00 K/ L    

0.04-0.54    L             

 

             BASOPHILS ABSOLUTE COUNT (BEAKER) (test code = 417) 0.01 K/ L    0.

01-0.08                  

 

             IMMATURE GRANULOCYTES-RELATIVE PERCENT (BEAKER) (test code = 2801) 

1 %          0-1                        





POCT-GLUCOSE [id number] 09:49:00* 



             Test Item    Value        Reference Range Interpretation Comments

 

             POC-GLUCOSE METER (BEAKER) (test code = 1538) 154 mg/dL      

     H            TESTED AT [facility]

 [address] [address]





COMPREHENSIVE METABOLIC [id number] 06:10:00* 



             Test Item    Value        Reference Range Interpretation Comments

 

             TOTAL PROTEIN (BEAKER) (test code = 770) 8.0 gm/dL    6.0-8.3      

             Specimen moderately

 hemolyzed

 

             ALBUMIN (BEAKER) (test code = 1145) 3.3 g/dL     3.5-5.0      L    

        Specimen moderately 

hemolyzed

 

             ALKALINE PHOSPHATASE (BEAKER) (test code = 346) 124 U/L      

                     

 

             BILIRUBIN TOTAL (BEAKER) (test code = 377) 1.3 mg/dL    0.2-1.2    

  H            Specimen 

moderately hemolyzed

 

             SODIUM (BEAKER) (test code = 381) 138 meq/L    136-145             

       

 

             POTASSIUM (BEAKER) (test code = 379) 4.0 meq/L    3.5-5.1          

         Specimen moderately 

hemolyzed

 

             CHLORIDE (BEAKER) (test code = 382) 96 meq/L            L    

         

 

             CO2 (BEAKER) (test code = 355) 28 meq/L     22-29                  

    

 

             BLOOD UREA NITROGEN (BEAKER) (test code = 354) 33 mg/dL     7-21   

      H             

 

             CREATININE (BEAKER) (test code = 358) 0.83 mg/dL   0.57-1.25       

          Specimen moderately

 hemolyzed

 

             GLUCOSE RANDOM (BEAKER) (test code = 652) 189 mg/dL          

 H             

 

             CALCIUM (BEAKER) (test code = 697) 9.9 mg/dL    8.4-10.2           

        

 

             AST (SGOT) (BEAKER) (test code = 353) 73 U/L       5-34         H  

          Specimen moderately 

hemolyzed

 

             ALT (SGPT) (BEAKER) (test code = 347) 39 U/L       6-55            

          Specimen moderately  

hemolyzed

 

             EGFR (BEAKER) (test code = 1092) 91 mL/min/1.73 sq m               

            ESTIMATED GFR IS NOT AS

 ACCURATE AS CREATININE CLEARANCE IN PREDICTING GLOMERULAR FILTRATION RATE. 
ESTIMATED GFR IS NOT APPLICABLE FOR DIALYSIS PATIENTS.





C-REACTIVE [id number] 06:10:00* 



             Test Item    Value        Reference Range Interpretation Comments

 

             C-REACTIVE PROTEIN (BEAKER) (test code = 676) 18.92 mg/dL  0.00-0.5

0    H             





CBC W/PLT COUNT & AUTO [id number] 05:59:00* 



             Test Item    Value        Reference Range Interpretation Comments

 

             WHITE BLOOD CELL COUNT (BEAKER) (test code = 775) 13.0 K/ L    3.5-

10.5     H             

 

             RED BLOOD CELL COUNT (BEAKER) (test code = 761) 4.21 M/ L    4.63-6

.08    L             

 

             HEMOGLOBIN (BEAKER) (test code = 410) 12.6 GM/DL   13.7-17.5    L  

           

 

             HEMATOCRIT (BEAKER) (test code = 411) 36.3 %       40.1-51.0    L  

           

 

             MEAN CORPUSCULAR VOLUME (BEAKER) (test code = 753) 86.2 fL      79.

0-92.2                  

 

             MEAN CORPUSCULAR HEMOGLOBIN (BEAKER) (test code = 751) 29.9 pg     

 25.7-32.2                  

 

                    MEAN CORPUSCULAR HEMOGLOBIN CONC (BEAKER) (test code = 752) 

34.7 GM/DL          32.3-36.5

                                                     

 

             RED CELL DISTRIBUTION WIDTH (BEAKER) (test code = 412) 13.8 %      

 11.6-14.4                  

 

             PLATELET COUNT (BEAKER) (test code = 756) 248 K/CU MM  150-450     

               

 

             MEAN PLATELET VOLUME (BEAKER) (test code = 754) 10.5 fL      9.4-12

.4                   

 

             NUCLEATED RED BLOOD CELLS (BEAKER) (test code = 413) 0 /100 WBC   0

-0                        

 

             NEUTROPHILS RELATIVE PERCENT (BEAKER) (test code = 429) 82 %       

                             

 

             LYMPHOCYTES RELATIVE PERCENT (BEAKER) (test code = 430) 13 %       

                             

 

             MONOCYTES RELATIVE PERCENT (BEAKER) (test code = 431) 4 %          

                           

 

             EOSINOPHILS RELATIVE PERCENT (BEAKER) (test code = 432) 0 %        

                             

 

             BASOPHILS RELATIVE PERCENT (BEAKER) (test code = 437) 0 %          

                           

 

             NEUTROPHILS ABSOLUTE COUNT (BEAKER) (test code = 670) 10.63 K/ L   

1.78-5.38    H             

 

             LYMPHOCYTES ABSOLUTE COUNT (BEAKER) (test code = 414) 1.65 K/ L    

1.32-3.57                  

 

             MONOCYTES ABSOLUTE COUNT (BEAKER) (test code = 415) 0.57 K/ L    0.

30-0.82                  

 

             EOSINOPHILS ABSOLUTE COUNT (BEAKER) (test code = 416) 0.00 K/ L    

0.04-0.54    L             

 

             BASOPHILS ABSOLUTE COUNT (BEAKER) (test code = 417) 0.01 K/ L    0.

01-0.08                  

 

             IMMATURE GRANULOCYTES-RELATIVE PERCENT (BEAKER) (test code = 2801) 

1 %          0-1

## 2020-09-15 VITALS — SYSTOLIC BLOOD PRESSURE: 139 MMHG | DIASTOLIC BLOOD PRESSURE: 81 MMHG

## 2020-09-15 VITALS — DIASTOLIC BLOOD PRESSURE: 78 MMHG | SYSTOLIC BLOOD PRESSURE: 157 MMHG

## 2020-09-15 VITALS — SYSTOLIC BLOOD PRESSURE: 134 MMHG | DIASTOLIC BLOOD PRESSURE: 63 MMHG

## 2020-09-15 VITALS — SYSTOLIC BLOOD PRESSURE: 127 MMHG | DIASTOLIC BLOOD PRESSURE: 68 MMHG

## 2020-09-15 VITALS — SYSTOLIC BLOOD PRESSURE: 131 MMHG | DIASTOLIC BLOOD PRESSURE: 68 MMHG

## 2020-09-15 VITALS — DIASTOLIC BLOOD PRESSURE: 68 MMHG | SYSTOLIC BLOOD PRESSURE: 127 MMHG

## 2020-09-15 VITALS — DIASTOLIC BLOOD PRESSURE: 86 MMHG | SYSTOLIC BLOOD PRESSURE: 159 MMHG

## 2020-09-15 LAB
ANION GAP SERPL CALC-SCNC: 14.6 MMOL/L (ref 8–16)
BASOPHILS # BLD AUTO: 0 10*3/UL (ref 0–0.1)
BASOPHILS NFR BLD AUTO: 0.1 % (ref 0–1)
BUN SERPL-MCNC: 8 MG/DL (ref 7–26)
BUN/CREAT SERPL: 12 (ref 6–25)
CALCIUM SERPL-MCNC: 7.9 MG/DL (ref 8.4–10.2)
CHLORIDE SERPL-SCNC: 106 MMOL/L (ref 98–107)
CO2 SERPL-SCNC: 23 MMOL/L (ref 22–29)
DEPRECATED NEUTROPHILS # BLD AUTO: 5.8 10*3/UL (ref 2.1–6.9)
EGFRCR SERPLBLD CKD-EPI 2021: > 60 ML/MIN (ref 60–?)
EOSINOPHIL # BLD AUTO: 0 10*3/UL (ref 0–0.4)
EOSINOPHIL NFR BLD AUTO: 0 % (ref 0–6)
ERYTHROCYTE [DISTWIDTH] IN CORD BLOOD: 15.7 % (ref 11.7–14.4)
GLUCOSE SERPLBLD-MCNC: 139 MG/DL (ref 74–118)
HCT VFR BLD AUTO: 33.6 % (ref 38.2–49.6)
HGB BLD-MCNC: 10.6 G/DL (ref 14–18)
LYMPHOCYTES # BLD: 0.8 10*3/UL (ref 1–3.2)
LYMPHOCYTES NFR BLD AUTO: 10.8 % (ref 18–39.1)
MCH RBC QN AUTO: 27 PG (ref 28–32)
MCHC RBC AUTO-ENTMCNC: 31.5 G/DL (ref 31–35)
MCV RBC AUTO: 85.5 FL (ref 81–99)
MONOCYTES # BLD AUTO: 0.4 10*3/UL (ref 0.2–0.8)
MONOCYTES NFR BLD AUTO: 6.3 % (ref 4.4–11.3)
NEUTS SEG NFR BLD AUTO: 82.1 % (ref 38.7–80)
PLATELET # BLD AUTO: 123 X10E3/UL (ref 140–360)
POTASSIUM SERPL-SCNC: 3.6 MMOL/L (ref 3.5–5.1)
RBC # BLD AUTO: 3.93 X10E6/UL (ref 4.3–5.7)
SODIUM SERPL-SCNC: 140 MMOL/L (ref 136–145)

## 2020-09-15 RX ADMIN — LISINOPRIL SCH MG: 10 TABLET ORAL at 09:38

## 2020-09-15 RX ADMIN — DEXTROSE AND SODIUM CHLORIDE SCH MLS/HR: 5; 450 INJECTION, SOLUTION INTRAVENOUS at 12:42

## 2020-09-15 RX ADMIN — TAZOBACTAM SODIUM AND PIPERACILLIN SODIUM SCH MLS/HR: 375; 3 INJECTION, SOLUTION INTRAVENOUS at 00:36

## 2020-09-15 RX ADMIN — TAZOBACTAM SODIUM AND PIPERACILLIN SODIUM SCH MLS/HR: 375; 3 INJECTION, SOLUTION INTRAVENOUS at 12:42

## 2020-09-15 RX ADMIN — SODIUM CHLORIDE PRN MG: 900 INJECTION INTRAVENOUS at 21:25

## 2020-09-15 RX ADMIN — FAMOTIDINE SCH MG: 20 TABLET, FILM COATED ORAL at 18:11

## 2020-09-15 RX ADMIN — DEXTROSE AND SODIUM CHLORIDE SCH MLS/HR: 5; 450 INJECTION, SOLUTION INTRAVENOUS at 01:01

## 2020-09-15 RX ADMIN — TAZOBACTAM SODIUM AND PIPERACILLIN SODIUM SCH MLS/HR: 375; 3 INJECTION, SOLUTION INTRAVENOUS at 18:17

## 2020-09-15 RX ADMIN — TAMSULOSIN HYDROCHLORIDE SCH MG: 0.4 CAPSULE ORAL at 21:51

## 2020-09-15 RX ADMIN — Medication PRN MG: at 21:25

## 2020-09-15 RX ADMIN — PREDNISONE SCH MG: 5 TABLET ORAL at 09:38

## 2020-09-15 RX ADMIN — Medication PRN MG: at 01:08

## 2020-09-15 RX ADMIN — TAZOBACTAM SODIUM AND PIPERACILLIN SODIUM SCH MLS/HR: 375; 3 INJECTION, SOLUTION INTRAVENOUS at 05:34

## 2020-09-15 RX ADMIN — FAMOTIDINE SCH MG: 20 TABLET, FILM COATED ORAL at 09:38

## 2020-09-15 RX ADMIN — PREDNISONE SCH MG: 5 TABLET ORAL at 18:12

## 2020-09-15 NOTE — NUR
pt arrived to room 211 via wheelchair with RN. pt awake, alert, oriented X4, no s/s of 
distress, no complaints at this time. pt transferred easily from wheelchair to bedside 
chair. pt sitting up comfortably. in stable condition.

## 2020-09-15 NOTE — PROGRESS NOTE
DATE:  09/15/2020  

 

CONSULTANTS:  Dr. Diaz with Surgery.

 

SUBJECTIVE:  The patient is seen in the ICU, lying in bed comfortably, reports 2/10 pain

in the right groin area.  Molina in place, tolerating full liquid diet.  He denies any

fever, chills, chest pain, shortness of breath, hematuria, passing gas. 

 

OBJECTIVE:  VITAL SIGNS:  Temperature 97.9, pulse is 79, respirations 16, blood pressure

134/63, pulse ox 97% on room air. 

GENERAL:  No acute distress. 

HEENT:  Normocephalic and atraumatic. 

NECK:  Supple. 

LUNGS:  Clear to auscultation. 

CARDIOVASCULAR:  Regular rate and rhythm. 

GI:  Soft and nontender, right groin area tenderness with palpation, dressing intact. 

NEUROLOGIC:  Alert, awake, and oriented x3. 

:  Molina in place. 

MUSCULOSKELETAL:  Moves all extremities. 

SKIN:  Dry. 

PSYCH:  Calm.

 

LABORATORY DATA:  WBC 7.0, hemoglobin 10.6, hematocrit 33.6, platelets 123.  Sodium 140,

potassium 3.6, CO2 of 23, creatinine 0.67, estimated GFR is 60.  Lactic acid 2.0,

calcium 7.9.  Coronavirus PCR not detected. 

 

IMPRESSION:  

1. Incarcerated right inguinal hernia, status post repair, continue on Zosyn and IV

fluids.  Pain management as needed.  Tolerating clear liquids and advancing to full

liquid diet per Dr. Diaz. 

2. Hypertension, continue on lisinopril and hydralazine as needed.

3. History of prostate cancer, continue Flomax.

4. Esophagitis, continue on PPI.

5. History of asthma, nebs as needed.

6. Lactic acidosis, resolved.

7. Deep vein thrombosis prophylaxis, no anticoagulation due to recent surgery.

 

PLAN:  Continue IV fluids and antibiotics.  Advance diet to full liquids per surgical

team.  Anticipate discharge once cleared by surgeon. 

 

 

Dictated by CURT Russell

 

______________________________

Shira Fernandes MD

 

MY/MODL

D:  09/15/2020 19:49:34

T:  09/15/2020 20:30:13

Job #:  [id number]

## 2020-09-16 VITALS — SYSTOLIC BLOOD PRESSURE: 131 MMHG | DIASTOLIC BLOOD PRESSURE: 71 MMHG

## 2020-09-16 VITALS — DIASTOLIC BLOOD PRESSURE: 64 MMHG | SYSTOLIC BLOOD PRESSURE: 137 MMHG

## 2020-09-16 VITALS — SYSTOLIC BLOOD PRESSURE: 138 MMHG | DIASTOLIC BLOOD PRESSURE: 78 MMHG

## 2020-09-16 VITALS — SYSTOLIC BLOOD PRESSURE: 137 MMHG | DIASTOLIC BLOOD PRESSURE: 64 MMHG

## 2020-09-16 VITALS — DIASTOLIC BLOOD PRESSURE: 80 MMHG | SYSTOLIC BLOOD PRESSURE: 129 MMHG

## 2020-09-16 VITALS — DIASTOLIC BLOOD PRESSURE: 71 MMHG | SYSTOLIC BLOOD PRESSURE: 129 MMHG

## 2020-09-16 LAB
ANION GAP SERPL CALC-SCNC: 12.6 MMOL/L (ref 8–16)
BASOPHILS # BLD AUTO: 0 10*3/UL (ref 0–0.1)
BASOPHILS NFR BLD AUTO: 0.3 % (ref 0–1)
BUN SERPL-MCNC: 7 MG/DL (ref 7–26)
BUN/CREAT SERPL: 11 (ref 6–25)
CALCIUM SERPL-MCNC: 7.9 MG/DL (ref 8.4–10.2)
CHLORIDE SERPL-SCNC: 109 MMOL/L (ref 98–107)
CO2 SERPL-SCNC: 23 MMOL/L (ref 22–29)
DEPRECATED NEUTROPHILS # BLD AUTO: 4.8 10*3/UL (ref 2.1–6.9)
EGFRCR SERPLBLD CKD-EPI 2021: > 60 ML/MIN (ref 60–?)
EOSINOPHIL # BLD AUTO: 0.1 10*3/UL (ref 0–0.4)
EOSINOPHIL NFR BLD AUTO: 1.2 % (ref 0–6)
ERYTHROCYTE [DISTWIDTH] IN CORD BLOOD: 15.7 % (ref 11.7–14.4)
GLUCOSE SERPLBLD-MCNC: 104 MG/DL (ref 74–118)
HCT VFR BLD AUTO: 30.5 % (ref 38.2–49.6)
HGB BLD-MCNC: 9.7 G/DL (ref 14–18)
LYMPHOCYTES # BLD: 1.2 10*3/UL (ref 1–3.2)
LYMPHOCYTES NFR BLD AUTO: 18.3 % (ref 18–39.1)
MCH RBC QN AUTO: 27.2 PG (ref 28–32)
MCHC RBC AUTO-ENTMCNC: 31.8 G/DL (ref 31–35)
MCV RBC AUTO: 85.7 FL (ref 81–99)
MONOCYTES # BLD AUTO: 0.4 10*3/UL (ref 0.2–0.8)
MONOCYTES NFR BLD AUTO: 5.8 % (ref 4.4–11.3)
NEUTS SEG NFR BLD AUTO: 73.8 % (ref 38.7–80)
PLATELET # BLD AUTO: 132 X10E3/UL (ref 140–360)
POTASSIUM SERPL-SCNC: 3.6 MMOL/L (ref 3.5–5.1)
RBC # BLD AUTO: 3.56 X10E6/UL (ref 4.3–5.7)
SODIUM SERPL-SCNC: 141 MMOL/L (ref 136–145)

## 2020-09-16 RX ADMIN — PREDNISONE SCH MG: 5 TABLET ORAL at 09:00

## 2020-09-16 RX ADMIN — FAMOTIDINE SCH MG: 20 TABLET, FILM COATED ORAL at 17:42

## 2020-09-16 RX ADMIN — PREDNISONE SCH MG: 5 TABLET ORAL at 17:42

## 2020-09-16 RX ADMIN — FAMOTIDINE SCH MG: 20 TABLET, FILM COATED ORAL at 07:30

## 2020-09-16 RX ADMIN — TAMSULOSIN HYDROCHLORIDE SCH MG: 0.4 CAPSULE ORAL at 20:56

## 2020-09-16 RX ADMIN — LISINOPRIL SCH MG: 10 TABLET ORAL at 09:00

## 2020-09-16 RX ADMIN — TAZOBACTAM SODIUM AND PIPERACILLIN SODIUM SCH MLS/HR: 375; 3 INJECTION, SOLUTION INTRAVENOUS at 18:20

## 2020-09-16 RX ADMIN — SODIUM CHLORIDE PRN MG: 900 INJECTION INTRAVENOUS at 23:53

## 2020-09-16 RX ADMIN — TAZOBACTAM SODIUM AND PIPERACILLIN SODIUM SCH MLS/HR: 375; 3 INJECTION, SOLUTION INTRAVENOUS at 23:53

## 2020-09-16 RX ADMIN — TAZOBACTAM SODIUM AND PIPERACILLIN SODIUM SCH MLS/HR: 375; 3 INJECTION, SOLUTION INTRAVENOUS at 00:44

## 2020-09-16 RX ADMIN — Medication PRN MG: at 23:53

## 2020-09-16 RX ADMIN — TAZOBACTAM SODIUM AND PIPERACILLIN SODIUM SCH MLS/HR: 375; 3 INJECTION, SOLUTION INTRAVENOUS at 05:33

## 2020-09-16 RX ADMIN — TAZOBACTAM SODIUM AND PIPERACILLIN SODIUM SCH MLS/HR: 375; 3 INJECTION, SOLUTION INTRAVENOUS at 12:00

## 2020-09-16 NOTE — NUR
BEDSIDE SHIFT REPORT RECEIVED. PATIENT IS RESTING IN BED, AAOX3. RESP EVEN AND UNLABORED. NO 
ACUTE DISTRESS NOTED. PATIENT DENIES OF ANY PAIN OR DISCOMFORT AT THIS TIME. TELE IN PLACE. 
EDUCATED PT ABOUT FALL PRECAUTIONS. PT VERBALIZED UNDERSTANDING. BED IS LOW AND LOCKED. SIDE 
RAILS X2. CALL LIGHT WITH IN EASY REACH. BED ALARM IS ON. ALL SAFETY MEASURES IN PLACE. PT 
DENIES NEEDS AT THIS TIME.

## 2020-09-16 NOTE — PROGRESS NOTE
DATE:  09/16/2020  

 

CONSULTANT:  Dr. Diaz with Surgery.

 

SUBJECTIVE:  The patient is sitting in bed with no acute distress, he has finished

eating lunch and reports was able to tolerate the mashed potatoes but not much else.

The Molina has been discontinued, voiding without difficulties.  He denies any chest

pain, shortness of breath, chest pain, nausea, vomiting, fever, or chills.  Has not had

a bowel movement yet. 

 

OBJECTIVE:  VITAL SIGNS:  Temperature 98.8, pulse is 92, respirations 20, blood pressure

129/80, pulse ox 96% on room air. 

GENERAL:  No acute distress. 

HEENT:  Normocephalic, atraumatic. 

NECK:  Supple. 

LUNGS:  Clear to auscultation. 

CARDIOVASCULAR:  Regular rate and rhythm. 

GI:  Soft and nontender.  Right groin dressing intact and mild tenderness. 

NEUROLOGIC:  Alert, awake and oriented x3. 

:  Voiding without difficulty. 

MUSCULOSKELETAL:  Moves all extremities. 

SKIN:  Dry. 

PSYCH:  Calm.

 

LABORATORY DATA:  WBC 6.51, hemoglobin 9.7, hematocrit 30.5, platelet 132.  Sodium 141,

potassium 3.6, CO2 of 23, BUN 7, creatinine 0.62, lactic acid 2.0, calcium 7.9. 

 

IMPRESSION:  

1. Incarcerated right inguinal hernia, status post repair.  Continue on Zosyn and pain

management as needed.  Advancing diet to GI soft per Surgery. 

2. Hypertension.  Continue lisinopril and hydralazine as needed.

3. History of prostate cancer.  Continue Flomax.

4. Esophagitis.  Continue PPI.

5. History of asthma.  Nebs p.r.n.

6. Deep vein thrombosis prophylaxis.  No anticoagulation due to recent surgery.

 

PLAN:  To continue antibiotics, advance diet to GI soft. We will consult PT to evaluate

and treat.  Anticipate discharge home tomorrow if continues to improve. 

 

 

Dictated by CURT Russell

 

______________________________

Shira Fernandes MD

 

MY/MODL

D:  09/16/2020 13:05:51

T:  09/16/2020 13:51:50

Job #:  [id number]

## 2020-09-16 NOTE — NUR
The pt. was received from the off-going nurse in stable condition. He denies pain or 
discomfort at this time.The pt. states "I will call you when I need med for pain"

## 2020-09-16 NOTE — NUR
Discontinuing PT services since patient is Mod I in functional mobility. Thank you

-------------------------------------------------------------------------------

Addendum: 09/16/20 at 1644 by Migue coto PT

-------------------------------------------------------------------------------

Amended: Links added.

## 2020-09-16 NOTE — NUR
Pt. expressed no spiritual or emotional concerns at this time.  provided hospitality 
and facilitated storytelling. No need to follow as this time.





JUANJO Candelariolain

Spiritual Care Department

O: [phone]

## 2020-09-17 VITALS — SYSTOLIC BLOOD PRESSURE: 159 MMHG | DIASTOLIC BLOOD PRESSURE: 84 MMHG

## 2020-09-17 VITALS — DIASTOLIC BLOOD PRESSURE: 65 MMHG | SYSTOLIC BLOOD PRESSURE: 138 MMHG

## 2020-09-17 VITALS — SYSTOLIC BLOOD PRESSURE: 147 MMHG | DIASTOLIC BLOOD PRESSURE: 73 MMHG

## 2020-09-17 VITALS — DIASTOLIC BLOOD PRESSURE: 78 MMHG | SYSTOLIC BLOOD PRESSURE: 142 MMHG

## 2020-09-17 LAB
HCT VFR BLD AUTO: 33.6 % (ref 38.2–49.6)
HGB BLD-MCNC: 10.6 G/DL (ref 14–18)

## 2020-09-17 RX ADMIN — FAMOTIDINE SCH MG: 20 TABLET, FILM COATED ORAL at 08:34

## 2020-09-17 RX ADMIN — LISINOPRIL SCH MG: 10 TABLET ORAL at 08:35

## 2020-09-17 RX ADMIN — PREDNISONE SCH MG: 5 TABLET ORAL at 08:34

## 2020-09-17 RX ADMIN — Medication PRN MG: at 10:03

## 2020-09-17 RX ADMIN — TAZOBACTAM SODIUM AND PIPERACILLIN SODIUM SCH MLS/HR: 375; 3 INJECTION, SOLUTION INTRAVENOUS at 06:23

## 2020-09-17 NOTE — DISCHARGE SUMMARY
PCP:  Dr. Cleary at [facility].

 

FINAL DISCHARGE DIAGNOSES:  

1. Incarcerated right inguinal hernia, status post repair.

2. Hypertension.

3. History of prostate cancer.

4. Esophagitis.

5. History of asthma.

 

CONSULTANTS:  Dr. Diaz with surgical team.

 

PROCEDURE:  He underwent incarcerated alert right inguinal hernia repair.  MESSI drain to

the right side. 

 

HISTORY:  Per HPI.

 

HOSPITAL COURSE:  This is a 74-year-old male, who presented to the ER with complaints of

abdominal pain and right lower groin pain radiating to the scrotum area.  Imaging, CT

abdomen and pelvis showed large fat and bowel containing right inguinal hernia, mild

edema in the hernia sac suggestive of strangulation, slightly dilated loops of small

bowel proximal to the hernia is suspicious for low-grade obstruction.  The hernia

contains distal small bowel, cecum, and appendix, Dr. Diaz was consulted, started on

IV antibiotics and underwent surgical repair.  He was monitored in the ICU and

transferred to the floor when stable.  He was started on clear liquids and advanced diet

as tolerated.  He was on prophylactic Zosyn.  Pain management with morphine.  MESSI drain

in place.  Molina catheter was discontinued.  Today, he is tolerating GI soft diet,

passing gas, and tolerating diet.  He is cleared from surgical team to discharge home.

We will discharge home on current medication, to follow up with his PCP and Dr. Diaz

in 4 days for drain removal. 

 

PHYSICAL EXAMINATION:

VITAL SIGNS:  Temperature 98.9, pulse is 96, respirations 20, blood pressure 147/73,

pulse ox is 96% on room air. 

GENERAL:  No acute distress. 

LUNGS:  Clear to auscultation. 

CARDIOVASCULAR:  Regular rate and rhythm. 

GI:  Soft and mild tenderness in the right groin area with palpation. 

NEUROLOGIC:  Alert, awake, and oriented x3. 

:  Voiding without difficulty. 

MUSCULOSKELETAL:  Moves all extremities/ 

SKIN:  Dry. 

PSYCH:  Calm.

CONDITION AT DISCHARGE:  Improved and stable.

 

DISCHARGE MEDICATIONS:  Please see medication reconciliation list.

 

FOLLOWUP:  Follow up with PCP in 1 to 2 weeks, with Dr. Diaz in 4 to 5 days for

removal of drain, with GI per appointment for Botox. 

 

TIME SPENT:  Total discharge time is 32 minutes.

 

 

Dictated by CURT Russell

 

______________________________

Yiching Lang Fernandes, MD

 

MY/MODL

D:  09/17/2020 20:26:58

T:  09/17/2020 22:47:13

Job #:  [id number]

 

[facility]            [facility]